# Patient Record
Sex: MALE | Race: WHITE | Employment: OTHER | URBAN - METROPOLITAN AREA
[De-identification: names, ages, dates, MRNs, and addresses within clinical notes are randomized per-mention and may not be internally consistent; named-entity substitution may affect disease eponyms.]

---

## 2022-01-25 ENCOUNTER — HOSPITAL ENCOUNTER (EMERGENCY)
Age: 72
Discharge: HOME | End: 2022-01-25
Attending: EMERGENCY MEDICINE
Payer: MEDICARE

## 2022-01-25 ENCOUNTER — APPOINTMENT (OUTPATIENT)
Dept: CT IMAGING | Age: 72
End: 2022-01-25
Attending: EMERGENCY MEDICINE
Payer: MEDICARE

## 2022-01-25 VITALS
WEIGHT: 157 LBS | RESPIRATION RATE: 16 BRPM | HEIGHT: 69 IN | BODY MASS INDEX: 23.25 KG/M2 | SYSTOLIC BLOOD PRESSURE: 123 MMHG | TEMPERATURE: 98.8 F | HEART RATE: 91 BPM | OXYGEN SATURATION: 98 % | DIASTOLIC BLOOD PRESSURE: 69 MMHG

## 2022-01-25 DIAGNOSIS — R09.1 PLEURISY: Primary | ICD-10-CM

## 2022-01-25 PROCEDURE — 99284 EMERGENCY DEPT VISIT MOD MDM: CPT

## 2022-01-25 PROCEDURE — 74011636637 HC RX REV CODE- 636/637: Performed by: EMERGENCY MEDICINE

## 2022-01-25 PROCEDURE — 74011000636 HC RX REV CODE- 636: Performed by: EMERGENCY MEDICINE

## 2022-01-25 PROCEDURE — 74011250637 HC RX REV CODE- 250/637: Performed by: EMERGENCY MEDICINE

## 2022-01-25 PROCEDURE — 74011000258 HC RX REV CODE- 258: Performed by: EMERGENCY MEDICINE

## 2022-01-25 PROCEDURE — 93005 ELECTROCARDIOGRAM TRACING: CPT

## 2022-01-25 PROCEDURE — 71260 CT THORAX DX C+: CPT

## 2022-01-25 RX ORDER — PREDNISONE 20 MG/1
40 TABLET ORAL DAILY
Qty: 8 TABLET | Refills: 0 | Status: SHIPPED | OUTPATIENT
Start: 2022-01-25 | End: 2022-01-29

## 2022-01-25 RX ORDER — SODIUM CHLORIDE 0.9 % (FLUSH) 0.9 %
10 SYRINGE (ML) INJECTION
Status: COMPLETED | OUTPATIENT
Start: 2022-01-25 | End: 2022-01-25

## 2022-01-25 RX ORDER — BENZONATATE 200 MG/1
200 CAPSULE ORAL
Qty: 21 CAPSULE | Refills: 0 | Status: SHIPPED | OUTPATIENT
Start: 2022-01-25 | End: 2022-02-01

## 2022-01-25 RX ORDER — BENZONATATE 100 MG/1
200 CAPSULE ORAL
Status: COMPLETED | OUTPATIENT
Start: 2022-01-25 | End: 2022-01-25

## 2022-01-25 RX ADMIN — SODIUM CHLORIDE 100 ML: 900 INJECTION, SOLUTION INTRAVENOUS at 20:10

## 2022-01-25 RX ADMIN — IOPAMIDOL 100 ML: 755 INJECTION, SOLUTION INTRAVENOUS at 20:10

## 2022-01-25 RX ADMIN — Medication 10 ML: at 20:10

## 2022-01-25 RX ADMIN — PREDNISONE 60 MG: 10 TABLET ORAL at 21:43

## 2022-01-25 RX ADMIN — BENZONATATE 200 MG: 100 CAPSULE ORAL at 21:43

## 2022-01-26 LAB
ATRIAL RATE: 85 BPM
CALCULATED P AXIS, ECG09: 68 DEGREES
CALCULATED R AXIS, ECG10: 69 DEGREES
CALCULATED T AXIS, ECG11: 53 DEGREES
DIAGNOSIS, 93000: NORMAL
P-R INTERVAL, ECG05: 184 MS
Q-T INTERVAL, ECG07: 354 MS
QRS DURATION, ECG06: 80 MS
QTC CALCULATION (BEZET), ECG08: 421 MS
VENTRICULAR RATE, ECG03: 85 BPM

## 2022-01-26 NOTE — ED TRIAGE NOTES
Arrives with face mask in place. Ambulatory with steady gait into triage. Reports sent from emergency md for R/O PE. States has been experiencing cough since 11/22/2021. COVID positive 1/4 at [place]. Reports covid pneumonia at time. Reports has been seen at emergency md three times since. Began experiencing right sided chest pain, onset couple days ago. States pain only with cough, sneeze and inspiration. Denies increased shortness of breath, fever/chills. CXR done today at urgent care and told continued pneumonia.  Dr palacios made aware of pt, orders received

## 2022-01-26 NOTE — ED PROVIDER NOTES
77-year-old gentleman sent to the ER from [facility] urgent care for a CT angiogram of the chest to rule out pulmonary embolism. Patient's had a cough since late November, started feeling worse in December and was diagnosed with COVID January 4. He went to the [address] [place] [address] where they gave him antibiotics for the virus but would not let a man, he did not go to the ER or seek any monoclonal antibody treatment. Patient feels that for the most part he is over the Covid he is having no fevers no chills no body aches, no nausea or diarrhea. He has persistent cough and some pleuritic right-sided sharp chest pain which is worse with breathing. No relief with aspirin. Chest x-ray today at the urgent care reveals continued infiltrates, his GFR was greater than 60 with a creatinine of 1.2 on the blood work done today. No past medical history on file. No past surgical history on file. No family history on file. Social History     Socioeconomic History    Marital status:      Spouse name: Not on file    Number of children: Not on file    Years of education: Not on file    Highest education level: Not on file   Occupational History    Not on file   Tobacco Use    Smoking status: Not on file    Smokeless tobacco: Not on file   Substance and Sexual Activity    Alcohol use: Not on file    Drug use: Not on file    Sexual activity: Not on file   Other Topics Concern    Not on file   Social History Narrative    Not on file     Social Determinants of Health     Financial Resource Strain:     Difficulty of Paying Living Expenses: Not on file   Food Insecurity:     Worried About Running Out of Food in the Last Year: Not on file    Farrukh of Food in the Last Year: Not on file   Transportation Needs:     Lack of Transportation (Medical): Not on file    Lack of Transportation (Non-Medical):  Not on file   Physical Activity:     Days of Exercise per Week: Not on file    Minutes of Exercise per Session: Not on file   Stress:     Feeling of Stress : Not on file   Social Connections:     Frequency of Communication with Friends and Family: Not on file    Frequency of Social Gatherings with Friends and Family: Not on file    Attends Jew Services: Not on file    Active Member of Clubs or Organizations: Not on file    Attends Club or Organization Meetings: Not on file    Marital Status: Not on file   Intimate Partner Violence:     Fear of Current or Ex-Partner: Not on file    Emotionally Abused: Not on file    Physically Abused: Not on file    Sexually Abused: Not on file   Housing Stability:     Unable to Pay for Housing in the Last Year: Not on file    Number of [place] in the Last Year: Not on file    Unstable Housing in the Last Year: Not on file         ALLERGIES: Patient has no known allergies. Review of Systems   Constitutional: Negative for chills and fever. HENT: Negative for rhinorrhea and sore throat. Eyes: Negative for discharge and redness. Respiratory: Positive for cough. Negative for shortness of breath. Cardiovascular: Positive for chest pain. Negative for palpitations and leg swelling. Gastrointestinal: Negative for abdominal pain, diarrhea, nausea and vomiting. Musculoskeletal: Negative for arthralgias and back pain. Skin: Negative for rash. Neurological: Negative for dizziness and headaches. All other systems reviewed and are negative. Vitals:    01/25/22 1917   BP: 128/70   Pulse: 93   Resp: 18   Temp: 97.7 °F (36.5 °C)   SpO2: 97%   Weight: 71.2 kg (157 lb)   Height: 5' 9\" (1.753 m)            Physical Exam  Vitals and nursing note reviewed. Constitutional:       General: He is not in acute distress. Appearance: Normal appearance. He is well-developed. He is not ill-appearing, toxic-appearing or diaphoretic. HENT:      Head: Normocephalic and atraumatic.       Right Ear: External ear normal.      Left Ear: External ear normal. Eyes:      General:         Right eye: No discharge. Left eye: No discharge. Conjunctiva/sclera: Conjunctivae normal.   Pulmonary:      Effort: Pulmonary effort is normal. No respiratory distress. Musculoskeletal:         General: Normal range of motion. Cervical back: Normal range of motion and neck supple. Skin:     General: Skin is warm and dry. Findings: No rash. Neurological:      General: No focal deficit present. Mental Status: He is alert and oriented to person, place, and time. Mental status is at baseline. Motor: No abnormal muscle tone. Comments: cni 2-12 grossly  Nl gait,  Nl speech     Psychiatric:         Mood and Affect: Mood normal.         Behavior: Behavior normal.          MDM  Number of Diagnoses or Management Options  Pleurisy: new and requires workup  Diagnosis management comments: Medical decision making note:  Postcode pleurisy, negative for pulmonary believes him on CT here today, mild patchy viral pneumonitis seen. Will start naproxen instead of aspirin, add Tessalon, Mucinex, and prednisone. He has nebulizer and an inhaler at home, will provide a spacer and teaching for use with his MDI  This concludes the \"medical decision making note\" part of this emergency department visit note. Amount and/or Complexity of Data Reviewed  Tests in the radiology section of CPT®: reviewed and ordered (CT CHEST PULMONARY EMBOLISM   Final Result    1. No evidence of pulmonary embolism.     2. Findings most compatible with viral pneumonitis.)  Decide to obtain previous medical records or to obtain history from someone other than the patient: yes    Risk of Complications, Morbidity, and/or Mortality  Presenting problems: moderate  Diagnostic procedures: low  Management options: low    Patient Progress  Patient progress: improved         Procedures

## 2023-12-05 ENCOUNTER — OFFICE VISIT (OUTPATIENT)
Dept: UROLOGY | Age: 73
End: 2023-12-05
Payer: OTHER GOVERNMENT

## 2023-12-05 DIAGNOSIS — R97.20 ELEVATED PSA: Primary | ICD-10-CM

## 2023-12-05 LAB
BILIRUBIN, URINE, POC: NEGATIVE
BLOOD URINE, POC: NORMAL
GLUCOSE URINE, POC: NEGATIVE
KETONES, URINE, POC: NEGATIVE
LEUKOCYTE ESTERASE, URINE, POC: NEGATIVE
NITRITE, URINE, POC: NEGATIVE
PH, URINE, POC: 5.5 (ref 4.6–8)
PROTEIN,URINE, POC: NORMAL
SPECIFIC GRAVITY, URINE, POC: >=1.03 (ref 1–1.03)
URINALYSIS CLARITY, POC: NORMAL
URINALYSIS COLOR, POC: NORMAL
UROBILINOGEN, POC: NORMAL

## 2023-12-05 PROCEDURE — 81003 URINALYSIS AUTO W/O SCOPE: CPT | Performed by: UROLOGY

## 2023-12-05 PROCEDURE — 1123F ACP DISCUSS/DSCN MKR DOCD: CPT | Performed by: UROLOGY

## 2023-12-05 PROCEDURE — 99204 OFFICE O/P NEW MOD 45 MIN: CPT | Performed by: UROLOGY

## 2023-12-05 ASSESSMENT — ENCOUNTER SYMPTOMS
RESPIRATORY NEGATIVE: 1
EYES NEGATIVE: 1
HEARTBURN: 1

## 2023-12-06 LAB
PSA FREE MFR SERPL: 19.7 %
PSA FREE SERPL-MCNC: 1.3 NG/ML
PSA SERPL-MCNC: 6.6 NG/ML

## 2023-12-14 ENCOUNTER — HOSPITAL ENCOUNTER (OUTPATIENT)
Dept: MRI IMAGING | Age: 73
Discharge: HOME | End: 2023-12-14
Attending: UROLOGY
Payer: OTHER GOVERNMENT

## 2023-12-14 DIAGNOSIS — R97.20 ELEVATED PSA: ICD-10-CM

## 2023-12-14 PROCEDURE — 72197 MRI PELVIS W/O & W/DYE: CPT

## 2023-12-14 PROCEDURE — A9579 GAD-BASE MR CONTRAST NOS,1ML: HCPCS | Performed by: UROLOGY

## 2023-12-14 PROCEDURE — 6360000004 HC RX CONTRAST MEDICATION: Performed by: UROLOGY

## 2023-12-14 RX ADMIN — GADOTERIDOL 15 ML: 279.3 INJECTION, SOLUTION INTRAVENOUS at 07:49

## 2023-12-26 ENCOUNTER — TELEPHONE (OUTPATIENT)
Dept: UROLOGY | Age: 73
End: 2023-12-26

## 2023-12-27 ENCOUNTER — TELEPHONE (OUTPATIENT)
Dept: UROLOGY | Age: 73
End: 2023-12-27

## 2023-12-27 DIAGNOSIS — R97.20 ELEVATED PROSTATE SPECIFIC ANTIGEN (PSA): ICD-10-CM

## 2023-12-27 NOTE — TELEPHONE ENCOUNTER
----- Message from Winston Freeman DO sent at 12/27/2023  3:55 PM EST -----  I reviewed with pt.  He has elected to proceed with MRI fusion biopsies of the prostate.  All risks, benefits and alternatives to the above mentioned procedure were discussed and the patient is willing to proceed at this time.  30min  Mac  Ua day of  Outpt  Rocephin 1g IV preop  I sent cipro

## 2023-12-28 ENCOUNTER — PREP FOR PROCEDURE (OUTPATIENT)
Dept: UROLOGY | Age: 73
End: 2023-12-28

## 2023-12-28 DIAGNOSIS — C61 PROSTATE CANCER (HCC): Primary | ICD-10-CM

## 2023-12-28 PROBLEM — R97.20 ELEVATED PROSTATE SPECIFIC ANTIGEN (PSA): Status: ACTIVE | Noted: 2023-12-27

## 2023-12-28 NOTE — TELEPHONE ENCOUNTER
Procedures: Procedure(s):   PROSTATE BIOPSY FUSION   Date: 1/29/2024   Time: 0900   Location: [place] [address]

## 2024-01-25 NOTE — PERIOP NOTE
Patient verified name and .  Order for consent found in EHR and matches case posting; patient verifies procedure.   Type 1B surgery, Phone assessment complete.  Orders received.  Labs per surgeon: none  Labs per anesthesia protocol: None    Patient answered medical/surgical history questions at their best of ability. All prior to admission medications documented in EPIC.  Patient instructed to take the following medications the day of surgery according to anesthesia guidelines with a small sip of water: None On the day before surgery please take 2 Tylenol in the morning and then again before bed. You may use either regular or extra strength.   Hold all vitamins 7 days prior to surgery and NSAIDS 5 days prior to surgery. Prescription meds to hold:None  Patient instructed on the following:    > Arrive at MAIN Entrance, time of arrival to be called the day before by 1700  > NPO after midnight, unless otherwise indicated, including gum, mints, and ice chips  > Responsible adult must drive patient to the hospital, stay during surgery, and patient will need supervision 24 hours after anesthesia  > Use non moisturizing soap in shower the night before surgery and on the morning of surgery  > All piercings must be removed prior to arrival.    > Leave all valuables (money and jewelry) at home but bring insurance card and ID on DOS.   > You may be required to pay a deductible or co-pay on the day of your procedure. You can pre-pay by calling [phone] if your surgery is at the [facility] or [phone] if your surgery is at the [facility].  > Do not wear make-up, nail polish, lotions, cologne, perfumes, powders, or oil on skin. Artificial nails are not permitted.

## 2024-01-28 ENCOUNTER — ANESTHESIA EVENT (OUTPATIENT)
Dept: SURGERY | Age: 74
End: 2024-01-28
Payer: OTHER GOVERNMENT

## 2024-01-29 ENCOUNTER — HOSPITAL ENCOUNTER (OUTPATIENT)
Age: 74
Setting detail: OUTPATIENT SURGERY
Discharge: HOME | End: 2024-01-29
Attending: UROLOGY | Admitting: UROLOGY
Payer: OTHER GOVERNMENT

## 2024-01-29 ENCOUNTER — ANESTHESIA (OUTPATIENT)
Dept: SURGERY | Age: 74
End: 2024-01-29
Payer: OTHER GOVERNMENT

## 2024-01-29 VITALS
TEMPERATURE: 97.6 F | DIASTOLIC BLOOD PRESSURE: 72 MMHG | OXYGEN SATURATION: 98 % | HEIGHT: 69 IN | WEIGHT: 172.2 LBS | HEART RATE: 66 BPM | SYSTOLIC BLOOD PRESSURE: 150 MMHG | BODY MASS INDEX: 25.51 KG/M2 | RESPIRATION RATE: 15 BRPM

## 2024-01-29 LAB
APPEARANCE UR: CLEAR
BILIRUB UR QL: NEGATIVE
COLOR UR: NORMAL
GLUCOSE UR STRIP.AUTO-MCNC: NEGATIVE MG/DL
HGB UR QL STRIP: NEGATIVE
KETONES UR QL STRIP.AUTO: NEGATIVE MG/DL
LEUKOCYTE ESTERASE UR QL STRIP.AUTO: NEGATIVE
NITRITE UR QL STRIP.AUTO: NEGATIVE
PH UR STRIP: 5 (ref 5–9)
PROT UR STRIP-MCNC: NEGATIVE MG/DL
SP GR UR REFRACTOMETRY: 1.02 (ref 1–1.02)
UROBILINOGEN UR QL STRIP.AUTO: 0.2 EU/DL (ref 0.2–1)

## 2024-01-29 PROCEDURE — 2580000003 HC RX 258: Performed by: UROLOGY

## 2024-01-29 PROCEDURE — 3700000000 HC ANESTHESIA ATTENDED CARE: Performed by: UROLOGY

## 2024-01-29 PROCEDURE — 2500000003 HC RX 250 WO HCPCS: Performed by: NURSE ANESTHETIST, CERTIFIED REGISTERED

## 2024-01-29 PROCEDURE — 76872 US TRANSRECTAL: CPT | Performed by: UROLOGY

## 2024-01-29 PROCEDURE — 6360000002 HC RX W HCPCS: Performed by: NURSE ANESTHETIST, CERTIFIED REGISTERED

## 2024-01-29 PROCEDURE — 3700000001 HC ADD 15 MINUTES (ANESTHESIA): Performed by: UROLOGY

## 2024-01-29 PROCEDURE — 7100000011 HC PHASE II RECOVERY - ADDTL 15 MIN: Performed by: UROLOGY

## 2024-01-29 PROCEDURE — 2580000003 HC RX 258: Performed by: ANESTHESIOLOGY

## 2024-01-29 PROCEDURE — 81003 URINALYSIS AUTO W/O SCOPE: CPT

## 2024-01-29 PROCEDURE — 7100000001 HC PACU RECOVERY - ADDTL 15 MIN: Performed by: UROLOGY

## 2024-01-29 PROCEDURE — 7100000000 HC PACU RECOVERY - FIRST 15 MIN: Performed by: UROLOGY

## 2024-01-29 PROCEDURE — 7100000010 HC PHASE II RECOVERY - FIRST 15 MIN: Performed by: UROLOGY

## 2024-01-29 PROCEDURE — 55700 PR PROSTATE NEEDLE BIOPSY ANY APPROACH: CPT | Performed by: UROLOGY

## 2024-01-29 PROCEDURE — 88305 TISSUE EXAM BY PATHOLOGIST: CPT

## 2024-01-29 PROCEDURE — 3600000012 HC SURGERY LEVEL 2 ADDTL 15MIN: Performed by: UROLOGY

## 2024-01-29 PROCEDURE — 3600000002 HC SURGERY LEVEL 2 BASE: Performed by: UROLOGY

## 2024-01-29 PROCEDURE — 6370000000 HC RX 637 (ALT 250 FOR IP): Performed by: ANESTHESIOLOGY

## 2024-01-29 PROCEDURE — 2709999900 HC NON-CHARGEABLE SUPPLY: Performed by: UROLOGY

## 2024-01-29 PROCEDURE — 6360000002 HC RX W HCPCS: Performed by: UROLOGY

## 2024-01-29 RX ORDER — SODIUM CHLORIDE, SODIUM LACTATE, POTASSIUM CHLORIDE, CALCIUM CHLORIDE 600; 310; 30; 20 MG/100ML; MG/100ML; MG/100ML; MG/100ML
INJECTION, SOLUTION INTRAVENOUS CONTINUOUS
Status: DISCONTINUED | OUTPATIENT
Start: 2024-01-29 | End: 2024-01-29 | Stop reason: HOSPADM

## 2024-01-29 RX ORDER — FENTANYL CITRATE 50 UG/ML
100 INJECTION, SOLUTION INTRAMUSCULAR; INTRAVENOUS
Status: DISCONTINUED | OUTPATIENT
Start: 2024-01-29 | End: 2024-01-29 | Stop reason: HOSPADM

## 2024-01-29 RX ORDER — FAMOTIDINE 20 MG/1
20 TABLET, FILM COATED ORAL ONCE
Status: COMPLETED | OUTPATIENT
Start: 2024-01-29 | End: 2024-01-29

## 2024-01-29 RX ORDER — OXYCODONE HYDROCHLORIDE 5 MG/1
10 TABLET ORAL PRN
Status: DISCONTINUED | OUTPATIENT
Start: 2024-01-29 | End: 2024-01-29 | Stop reason: HOSPADM

## 2024-01-29 RX ORDER — SODIUM CHLORIDE 0.9 % (FLUSH) 0.9 %
5-40 SYRINGE (ML) INJECTION EVERY 12 HOURS SCHEDULED
Status: DISCONTINUED | OUTPATIENT
Start: 2024-01-29 | End: 2024-01-29 | Stop reason: HOSPADM

## 2024-01-29 RX ORDER — PROPOFOL 10 MG/ML
INJECTION, EMULSION INTRAVENOUS PRN
Status: DISCONTINUED | OUTPATIENT
Start: 2024-01-29 | End: 2024-01-29 | Stop reason: SDUPTHER

## 2024-01-29 RX ORDER — LIDOCAINE HYDROCHLORIDE 10 MG/ML
1 INJECTION, SOLUTION INFILTRATION; PERINEURAL
Status: DISCONTINUED | OUTPATIENT
Start: 2024-01-29 | End: 2024-01-29 | Stop reason: HOSPADM

## 2024-01-29 RX ORDER — HYDROMORPHONE HYDROCHLORIDE 2 MG/ML
0.25 INJECTION, SOLUTION INTRAMUSCULAR; INTRAVENOUS; SUBCUTANEOUS EVERY 5 MIN PRN
Status: DISCONTINUED | OUTPATIENT
Start: 2024-01-29 | End: 2024-01-29 | Stop reason: HOSPADM

## 2024-01-29 RX ORDER — SODIUM CHLORIDE 9 MG/ML
INJECTION, SOLUTION INTRAVENOUS PRN
Status: DISCONTINUED | OUTPATIENT
Start: 2024-01-29 | End: 2024-01-29 | Stop reason: HOSPADM

## 2024-01-29 RX ORDER — MIDAZOLAM HYDROCHLORIDE 2 MG/2ML
2 INJECTION, SOLUTION INTRAMUSCULAR; INTRAVENOUS
Status: DISCONTINUED | OUTPATIENT
Start: 2024-01-29 | End: 2024-01-29 | Stop reason: HOSPADM

## 2024-01-29 RX ORDER — OXYCODONE HYDROCHLORIDE 5 MG/1
5 TABLET ORAL PRN
Status: DISCONTINUED | OUTPATIENT
Start: 2024-01-29 | End: 2024-01-29 | Stop reason: HOSPADM

## 2024-01-29 RX ORDER — LIDOCAINE HYDROCHLORIDE 20 MG/ML
INJECTION, SOLUTION EPIDURAL; INFILTRATION; INTRACAUDAL; PERINEURAL PRN
Status: DISCONTINUED | OUTPATIENT
Start: 2024-01-29 | End: 2024-01-29 | Stop reason: SDUPTHER

## 2024-01-29 RX ORDER — SODIUM CHLORIDE 9 MG/ML
INJECTION, SOLUTION INTRAVENOUS CONTINUOUS
Status: DISCONTINUED | OUTPATIENT
Start: 2024-01-29 | End: 2024-01-29 | Stop reason: HOSPADM

## 2024-01-29 RX ORDER — ONDANSETRON 2 MG/ML
4 INJECTION INTRAMUSCULAR; INTRAVENOUS
Status: DISCONTINUED | OUTPATIENT
Start: 2024-01-29 | End: 2024-01-29 | Stop reason: HOSPADM

## 2024-01-29 RX ORDER — ACETAMINOPHEN 500 MG
1000 TABLET ORAL ONCE
Status: COMPLETED | OUTPATIENT
Start: 2024-01-29 | End: 2024-01-29

## 2024-01-29 RX ORDER — HYDROMORPHONE HYDROCHLORIDE 2 MG/ML
0.5 INJECTION, SOLUTION INTRAMUSCULAR; INTRAVENOUS; SUBCUTANEOUS EVERY 10 MIN PRN
Status: DISCONTINUED | OUTPATIENT
Start: 2024-01-29 | End: 2024-01-29 | Stop reason: HOSPADM

## 2024-01-29 RX ORDER — DIPHENHYDRAMINE HYDROCHLORIDE 50 MG/ML
12.5 INJECTION INTRAMUSCULAR; INTRAVENOUS
Status: DISCONTINUED | OUTPATIENT
Start: 2024-01-29 | End: 2024-01-29 | Stop reason: HOSPADM

## 2024-01-29 RX ORDER — SODIUM CHLORIDE 0.9 % (FLUSH) 0.9 %
5-40 SYRINGE (ML) INJECTION PRN
Status: DISCONTINUED | OUTPATIENT
Start: 2024-01-29 | End: 2024-01-29 | Stop reason: HOSPADM

## 2024-01-29 RX ADMIN — PROPOFOL 50 MG: 10 INJECTION, EMULSION INTRAVENOUS at 08:15

## 2024-01-29 RX ADMIN — WATER 1000 MG: 1 INJECTION INTRAMUSCULAR; INTRAVENOUS; SUBCUTANEOUS at 08:12

## 2024-01-29 RX ADMIN — LIDOCAINE HYDROCHLORIDE 100 MG: 20 INJECTION, SOLUTION EPIDURAL; INFILTRATION; INTRACAUDAL; PERINEURAL at 08:10

## 2024-01-29 RX ADMIN — SODIUM CHLORIDE, POTASSIUM CHLORIDE, SODIUM LACTATE AND CALCIUM CHLORIDE: 600; 310; 30; 20 INJECTION, SOLUTION INTRAVENOUS at 07:50

## 2024-01-29 RX ADMIN — PROPOFOL 50 MG: 10 INJECTION, EMULSION INTRAVENOUS at 08:10

## 2024-01-29 RX ADMIN — PROPOFOL 50 MG: 10 INJECTION, EMULSION INTRAVENOUS at 08:18

## 2024-01-29 RX ADMIN — ACETAMINOPHEN 1000 MG: 500 TABLET ORAL at 07:52

## 2024-01-29 RX ADMIN — FAMOTIDINE 20 MG: 20 TABLET, FILM COATED ORAL at 07:52

## 2024-01-29 RX ADMIN — PROPOFOL 50 MG: 10 INJECTION, EMULSION INTRAVENOUS at 08:12

## 2024-01-29 ASSESSMENT — PAIN - FUNCTIONAL ASSESSMENT
PAIN_FUNCTIONAL_ASSESSMENT: NONE - DENIES PAIN
PAIN_FUNCTIONAL_ASSESSMENT: NONE - DENIES PAIN
PAIN_FUNCTIONAL_ASSESSMENT: 0-10

## 2024-01-29 NOTE — ANESTHESIA POSTPROCEDURE EVALUATION
Department of Anesthesiology  Postprocedure Note    Patient: Bennie Garrett  MRN: [id number]  YOB: 1950  Date of evaluation: 1/29/2024    Procedure Summary     Date: 01/29/24 Room / Location: [facility] MAIN OR  / [facility] MAIN OR    Anesthesia Start: 0806 Anesthesia Stop: 0829    Procedure: PROSTATE BIOPSY FUSION (Anus) Diagnosis:       Elevated prostate specific antigen (PSA)      (Elevated prostate specific antigen (PSA) [R97.20])    Providers: Winston Freeman DO Responsible Provider: Vibha Amin MD    Anesthesia Type: MAC ASA Status: 3          Anesthesia Type: MAC    Joshua Phase I: Joshua Score: 8    Joshua Phase II: Joshua Score: 10    Anesthesia Post Evaluation    Patient location during evaluation: PACU  Patient participation: complete - patient participated  Level of consciousness: awake and alert  Airway patency: patent  Nausea & Vomiting: no nausea  Cardiovascular status: hemodynamically stable  Respiratory status: acceptable  Hydration status: euvolemic  Pain management: adequate and satisfactory to patient        No notable events documented.

## 2024-01-29 NOTE — ANESTHESIA PRE PROCEDURE
Department of Anesthesiology  Preprocedure Note       Name:  Bennie Garrett   Age:  73 y.o.  :  1950                                          MRN:  [id number]         Date:  2024      Surgeon: Surgeon(s):  Winston Freeman DO    Procedure: Procedure(s):  PROSTATE BIOPSY FUSION    Medications prior to admission:   Prior to Admission medications    Not on File       Current medications:    Current Facility-Administered Medications   Medication Dose Route Frequency Provider Last Rate Last Admin    lidocaine 1 % injection 1 mL  1 mL IntraDERmal Once PRN Vibha Amin MD        acetaminophen (TYLENOL) tablet 1,000 mg  1,000 mg Oral Once Vibha Amin MD        fentaNYL (SUBLIMAZE) injection 100 mcg  100 mcg IntraVENous Once PRN Vibha Amin MD        famotidine (PEPCID) tablet 20 mg  20 mg Oral Once Vibha Amin MD        0.9 % sodium chloride infusion   IntraVENous Continuous Vibha Amin MD        lactated ringers IV soln infusion   IntraVENous Continuous Vibha Amin MD        sodium chloride flush 0.9 % injection 5-40 mL  5-40 mL IntraVENous 2 times per day Vibha Amin MD        sodium chloride flush 0.9 % injection 5-40 mL  5-40 mL IntraVENous PRN Vibha Amin MD        0.9 % sodium chloride infusion   IntraVENous PRN Vibha Amin MD        midazolam PF (VERSED) injection 2 mg  2 mg IntraVENous Once PRN Vibha Amin MD        cefTRIAXone (ROCEPHIN) 1,000 mg in sterile water 10 mL IV syringe  1,000 mg IntraVENous On Call to OR Winston Freeman DO           Allergies:  No Known Allergies    Problem List:    Patient Active Problem List   Diagnosis Code    Elevated prostate specific antigen (PSA) R97.20       Past Medical History:  History reviewed. No pertinent past medical history.    Past Surgical History:  History reviewed. No pertinent surgical history.    Social History:    Social History     Tobacco Use

## 2024-01-29 NOTE — H&P
[facility]  [address] [place] [address]  [phone]    Bennie Garrett  : 1950     HPI   73 y.o., male returns in follow up for ane elevated PSA.  PSA was 6.6 (19% free) on 23.  MRI on 23 showed a pirads 5 LM 2.7cm TZ lesion.      History reviewed. No pertinent past medical history.  History reviewed. No pertinent surgical history.  Current Facility-Administered Medications   Medication Dose Route Frequency Provider Last Rate Last Admin    lidocaine 1 % injection 1 mL  1 mL IntraDERmal Once PRN Vibha Amin MD        fentaNYL (SUBLIMAZE) injection 100 mcg  100 mcg IntraVENous Once PRN Vibha Amin MD        0.9 % sodium chloride infusion   IntraVENous Continuous Vibha Amin MD        lactated ringers IV soln infusion   IntraVENous Continuous Vibha Amin  mL/hr at 24 0750 New Bag at 24 0750    sodium chloride flush 0.9 % injection 5-40 mL  5-40 mL IntraVENous 2 times per day Vibha Amin MD        sodium chloride flush 0.9 % injection 5-40 mL  5-40 mL IntraVENous PRN Vibha Amin MD        0.9 % sodium chloride infusion   IntraVENous PRN Vibha Amin MD        midazolam PF (VERSED) injection 2 mg  2 mg IntraVENous Once PRN Vibha Amin MD        cefTRIAXone (ROCEPHIN) 1,000 mg in sterile water 10 mL IV syringe  1,000 mg IntraVENous On Call to OR Winston Freeman DO         No Known Allergies  Social History     Socioeconomic History    Marital status:      Spouse name: Not on file    Number of children: Not on file    Years of education: Not on file    Highest education level: Not on file   Occupational History    Not on file   Tobacco Use    Smoking status: Never    Smokeless tobacco: Never   Vaping Use    Vaping Use: Never used   Substance and Sexual Activity    Alcohol use: Never    Drug use: Never    Sexual activity: Not on file   Other Topics Concern    Not on file

## 2024-01-29 NOTE — BRIEF OP NOTE
Brief Postoperative Note      Patient: Bennie Garrett  YOB: 1950  MRN: [id number]    Date of Procedure: 1/29/2024    Pre-Op Diagnosis Codes:     * Elevated prostate specific antigen (PSA) [R97.20]    Post-Op Diagnosis: Same       Procedure(s):  PROSTATE BIOPSY FUSION    Surgeon(s):  Joyce Goode,     Assistant:  * No surgical staff found *    Anesthesia: Monitor Anesthesia Care    Estimated Blood Loss (mL): <5cc    Complications: none immediate    Specimens:   ID Type Source Tests Collected by Time Destination   A : LLB Tissue Prostate SURGICAL PATHOLOGY Joyce Goode P, DO 1/29/2024 0810    B : LLM Tissue Prostate SURGICAL PATHOLOGY Joyce Goode P, DO 1/29/2024 0811    C : LLA Tissue Prostate SURGICAL PATHOLOGY Joyce Goode P, DO 1/29/2024 0811    D : LB Tissue Prostate SURGICAL PATHOLOGY Joyce Goode P, DO 1/29/2024 0811    E : LM Tissue Prostate SURGICAL PATHOLOGY Joyce Goode P, DO 1/29/2024 0811    F : LA Tissue Prostate SURGICAL PATHOLOGY Joyce Goode P, DO 1/29/2024 0811    G : RLB Tissue Prostate SURGICAL PATHOLOGY Joyce Goode P, DO 1/29/2024 0811    H : RLM Tissue Prostate SURGICAL PATHOLOGY Joyce Goode P, DO 1/29/2024 0811    I : RLA Tissue Prostate SURGICAL PATHOLOGY Joyce Goode P, DO 1/29/2024 0811    J : RA Tissue Prostate SURGICAL PATHOLOGY Joyce Goode P, DO 1/29/2024 0811    K : RM Tissue Prostate SURGICAL PATHOLOGY Joyce Goode P, DO 1/29/2024 0811    L : RB Tissue Prostate SURGICAL PATHOLOGY Joyce Goode P, DO 1/29/2024 0812    M : BISMARK 1 LEFT ANTERIOR Tissue Prostate SURGICAL PATHOLOGY Joyce Goode P, DO 1/29/2024 0812        Implants:  * No implants in log *      Drains: * No LDAs found *    Findings: see op  note      Electronically signed by JOYCE GOODE DO on 1/29/2024 at 8:31 AM

## 2024-01-29 NOTE — OP NOTE
[facility]  OPERATIVE REPORT    Name:  MARGRET CAMARA  MR#:  [id number]  :  1950  ACCOUNT #:  [id number]  DATE OF SERVICE:  2024    PREOPERATIVE DIAGNOSIS:  Elevated prostate-specific antigen.    POSTOPERATIVE DIAGNOSIS:  Elevated prostate-specific antigen.    PROCEDURE PERFORMED:  MRI fusion biopsies of the prostate.    SURGEON:  Winston Freeman DO    ASSISTANT:  None.    ANESTHESIA:  MAC.    COMPLICATIONS:  None immediate.    SPECIMENS REMOVED:  Prostate biopsies.    IMPLANTS:  None.    ESTIMATED BLOOD LOSS:  Less than 5 mL.    CLINICAL HISTORY:  This is a 73-year-old gentleman, who was recently found to have an elevated PSA of 6.6 on 2023.  An MRI of the prostate on 2023 shows a PI-RADS 5 left mid anterior lesion.  All risks, benefits and alternatives to the above-mentioned procedure have been discussed and he is willing to proceed at this time.    DESCRIPTION OF OPERATIVE PROCEDURE:  Patient consent was obtained.  The patient was brought back to the operating room at which time he was placed in a modified right lateral decubitus position.  All pressure points were carefully padded and the patient was secured to the table.  After the uneventful induction of MAC anesthesia, a digital rectal examination was performed.  This revealed an anodular prostate.  The transrectal ultrasound probe was then inserted into the rectum and the prostate was surveyed.  The prostate was mildly heterogeneous in nature.  No discrete lesions were seen.  Volumetric measurements were obtained.  Calculated prostate volume was 26 mL.  Using the UroNav software, the previously marked MRI images were fused to the ultrasound images.  There was one region of interest that corresponded to a lesion at the left anterior gland.  This area was biopsied several times.  In addition, a standard sextant biopsy of the prostate was performed.  Three far lateral biopsies were obtained from the base, mid

## 2024-01-29 NOTE — DISCHARGE INSTRUCTIONS
Narcotics:  Limit your activities  A responsible adult needs to be with you for the next 24 hours  Do not drive and operate hazardous machinery  Do not make important personal or business decisions  Do not drink alcoholic beverages  If you have not urinated within 8 hours after discharge, and you are experiencing discomfort from urinary retention, please go to the nearest ED.  If you have sleep apnea and have a CPAP machine, please use it for all naps and sleeping.  Please use caution when taking narcotics and any of your home medications that may cause drowsiness.  *  Please give a list of your current medications to your Primary Care Provider.  *  Please update this list whenever your medications are discontinued, doses are      changed, or new medications (including over-the-counter products) are added.  *  Please carry medication information at all times in case of emergency situations.    These are general instructions for a healthy lifestyle:  No smoking/ No tobacco products/ Avoid exposure to second hand smoke  Surgeon General's Warning:  Quitting smoking now greatly reduces serious risk to your health.  Obesity, smoking, and sedentary lifestyle greatly increases your risk for illness  A healthy diet, regular physical exercise & weight monitoring are important for maintaining a healthy lifestyle    You may be retaining fluid if you have a history of heart failure or if you experience any of the following symptoms:  Weight gain of 3 pounds or more overnight or 5 pounds in a week, increased swelling in our hands or feet or shortness of breath while lying flat in bed.  Please call your doctor as soon as you notice any of these symptoms; do not wait until your next office visit.

## 2024-02-13 ENCOUNTER — TELEPHONE (OUTPATIENT)
Dept: UROLOGY | Age: 74
End: 2024-02-13

## 2024-02-13 ENCOUNTER — OFFICE VISIT (OUTPATIENT)
Dept: UROLOGY | Age: 74
End: 2024-02-13
Payer: OTHER GOVERNMENT

## 2024-02-13 DIAGNOSIS — C61 PROSTATE CANCER (HCC): Primary | ICD-10-CM

## 2024-02-13 DIAGNOSIS — C61 MALIGNANT NEOPLASM OF PROSTATE (HCC): ICD-10-CM

## 2024-02-13 PROCEDURE — 1123F ACP DISCUSS/DSCN MKR DOCD: CPT | Performed by: UROLOGY

## 2024-02-13 PROCEDURE — 99214 OFFICE O/P EST MOD 30 MIN: CPT | Performed by: UROLOGY

## 2024-02-13 NOTE — TELEPHONE ENCOUNTER
----- Message from Winston Freeman DO sent at 2/13/2024 12:45 PM EST -----  Regarding: surg  RALP  Not before mid March

## 2024-02-13 NOTE — PROGRESS NOTES
[facility]  [address] [place] [address]  [phone]    Bennie Garrett  : 1950     HPI   73 y.o., male returns in follow up for CaP.  PSA was 6.6 (19% free) on 23. MRI on 23 showed a pirads 5 LM 2.7cm TZ lesion.   Fusion biopsies completed on 24.  Path showed [place] 4+3 in a LM core and [place] 6 in 6 biopsy cores.  Vol was 26g.  Prior bilateral IHR and UHR.  Retired .      History reviewed. No pertinent past medical history.  Past Surgical History:   Procedure Laterality Date    PROSTATE BIOPSY N/A 2024    PROSTATE BIOPSY FUSION performed by Winston Freeman DO at [facility] MAIN OR     No current outpatient medications on file.     No current facility-administered medications for this visit.     No Known Allergies  Social History     Socioeconomic History    Marital status:      Spouse name: Not on file    Number of children: Not on file    Years of education: Not on file    Highest education level: Not on file   Occupational History    Not on file   Tobacco Use    Smoking status: Never    Smokeless tobacco: Never   Vaping Use    Vaping Use: Never used   Substance and Sexual Activity    Alcohol use: Never    Drug use: Never    Sexual activity: Not on file   Other Topics Concern    Not on file   Social History Narrative    Not on file     Social Determinants of Health     Financial Resource Strain: Not on file   Food Insecurity: Not on file   Transportation Needs: Not on file   Physical Activity: Not on file   Stress: Not on file   Social Connections: Not on file   Intimate Partner Violence: Not on file   Housing Stability: Not on file     History reviewed. No pertinent family history.    Review of Systems  All systems reviewed and are negative at this time.    Physical Exam  There were no vitals taken for this visit.  General appearance - alert, well appearing, and in no distress  Mental status - alert and oriented  Eyes - extraocular eye

## 2024-02-23 DIAGNOSIS — C61 MALIGNANT NEOPLASM OF PROSTATE (HCC): Primary | ICD-10-CM

## 2024-02-23 NOTE — TELEPHONE ENCOUNTER
Procedures: Procedure(s):   PROSTATECTOMY LAPAROSCOPIC ROBOTIC/POSSIBLE BILATERAL LYMPH NODE DISSECTION   Date: 3/28/2024   Time: 1032   Location: [facility] MAIN OR 11

## 2024-03-04 ENCOUNTER — HOSPITAL ENCOUNTER (OUTPATIENT)
Dept: PHYSICAL THERAPY | Age: 74
Setting detail: RECURRING SERIES
Discharge: HOME | End: 2024-03-07
Attending: UROLOGY
Payer: OTHER GOVERNMENT

## 2024-03-04 DIAGNOSIS — R27.9 LACK OF COORDINATION: Primary | ICD-10-CM

## 2024-03-04 PROCEDURE — 97161 PT EVAL LOW COMPLEX 20 MIN: CPT

## 2024-03-04 PROCEDURE — 97530 THERAPEUTIC ACTIVITIES: CPT

## 2024-03-04 ASSESSMENT — PAIN SCALES - GENERAL: PAINLEVEL_OUTOF10: 0

## 2024-03-04 NOTE — THERAPY EVALUATION
1x/week for 90 days     Interventions Planned (Treatment may consist of any combination of the following):    Home Exercise Program (HEP), Therapeutic Activites, and Therapeutic Exercise/Strengthening     Goals: (Goals have been discussed and agreed upon with patient.)  Short-Term Functional Goals: Time Frame: 4 weeks  Patient will demonstrate I with basic PFM HEP to improve awareness, coordination, and timing of PFM.  Patient will verbalize an understanding of pelvic anatomy and causes of post op incontinence.  Patient will demonstrate understanding of and ability to teach back appropriate water intake, bladder irritants, toileting frequency, and positioning for improved self-management of symptoms.        Discharge Goals:   DISCHARGE GOALS TO BE DETERMINED POST OPERATIVELY            Outcome Measure:   NIH - Chronic Prostatitis Symptom Index (NIH - CPSI for Males):  Score:  Initial: 12    Pain: 6  Urinary: 1  QOL: 5 Most Recent: X (Date: -- )       Medical Necessity:   > Skilled intervention continues to be required due to above mentioned deficits.  Reason For Services/Other Comments:  > Patient continues to require skilled intervention due to above mentioned deficits.    Regarding Bennie Garrett's therapy, I certify that the treatment plan above will be carried out by a therapist or under their direction.  Thank you for this referral,  Hattie Glaser, PT     Referring Physician Signature: Winston Freeman DO _______________________________ Date _____________        Charge Capture  Appt Desk     Future Appointments   Date Time Provider Department Center   3/20/2024 10:00 AM Winston Freeman DO [id number] AMB   3/21/2024 10:00 AM [facility] ASSESSMENT  09 [facility]   4/4/2024  3:00 PM Winston Freeman DO [id number] [facility] AMB

## 2024-03-04 NOTE — PROGRESS NOTES
Bennie Lockwood   : 1950  Primary: Vaccn [employer] (Other)  Secondary:  [facility] @ [address] VENEGAS Heraclio  [facility] [address]  Phone: [phone]  Fax: [phone] Plan Frequency: 1x/week for 90 days  Plan of Care/Certification Expiration Date: 24        Plan of Care/Certification Expiration Date:  Plan of Care/Certification Expiration Date: 24    Frequency/Duration: Plan Frequency: 1x/week for 90 days      Time In/Out:   Time In: 1406  Time Out: 1456      PT Visit Info:    Progress Note Due Date: 24      Visit Count:  1    OUTPATIENT PHYSICAL THERAPY:   Treatment Note 3/4/2024       Episode  (PFPT)               Treatment Diagnosis:    Lack of coordination  Medical/Referring Diagnosis:    Malignant neoplasm of prostate (HCC)    Referring Physician:  Winston Freeman DO MD Orders:  PT Eval and Treat   Return MD Appt:     Date of Onset:  No data recorded   Allergies:   Patient has no known allergies.  Restrictions/Precautions:   None      Interventions Planned (Treatment may consist of any combination of the following):     See Assessment Note    Subjective Comments:     Initial Pain Level::     0/10  Post Session Pain Level:       0/10  Medications Last Reviewed:  3/4/2024  Updated Objective Findings:  See Evaluation Note from today  Treatment   THERAPEUTIC ACTIVITY: ( 30 minutes): Functional activity education regarding anatomy, pathology and role of pelvic floor muscle (PFM) function in relation to presenting symptoms and role of pelvic floor therapy in conservative treatment. and Functional activity education regarding pre and post operative bowel and bladder health including bladder irritants, changes in urinary symptoms, bowel management and toilet positioning and role and purpose of penile rehab for healing s/p RALP.    TA Educational Topic Notes Date Completed   Pathology/Anatomy/PFM Function Reviewed  3/4/24   Bladder health education

## 2024-03-20 ENCOUNTER — OFFICE VISIT (OUTPATIENT)
Dept: UROLOGY | Age: 74
End: 2024-03-20
Payer: OTHER GOVERNMENT

## 2024-03-20 DIAGNOSIS — C61 PROSTATE CANCER (HCC): Primary | ICD-10-CM

## 2024-03-20 PROCEDURE — 1123F ACP DISCUSS/DSCN MKR DOCD: CPT | Performed by: UROLOGY

## 2024-03-20 PROCEDURE — 99214 OFFICE O/P EST MOD 30 MIN: CPT | Performed by: UROLOGY

## 2024-03-20 NOTE — PROGRESS NOTES
[facility]  [address] [place] [address]  [phone]    Bennie Garrett  : 1950     HPI   73 y.o., male returns in follow up for CaP.  PSA was 6.6 (19% free) on 23. MRI on 23 showed a pirads 5 LM 2.7cm TZ lesion.   Fusion biopsies completed on 24.  Path showed Wheeling 4+3 in a LM core and Grayson 6 in 6 biopsy cores.  Vol was 26g.  Prior bilateral IHR and UHR.  Retired        History reviewed. No pertinent past medical history.  Past Surgical History:   Procedure Laterality Date    PROSTATE BIOPSY N/A 2024    PROSTATE BIOPSY FUSION performed by Winston Freeman DO at [facility] MAIN OR     No current outpatient medications on file.     No current facility-administered medications for this visit.     No Known Allergies  Social History     Socioeconomic History    Marital status:      Spouse name: Not on file    Number of children: Not on file    Years of education: Not on file    Highest education level: Not on file   Occupational History    Not on file   Tobacco Use    Smoking status: Never    Smokeless tobacco: Never   Vaping Use    Vaping Use: Never used   Substance and Sexual Activity    Alcohol use: Never    Drug use: Never    Sexual activity: Not on file   Other Topics Concern    Not on file   Social History Narrative    Not on file     Social Determinants of Health     Financial Resource Strain: Not on file   Food Insecurity: Not on file   Transportation Needs: Not on file   Physical Activity: Not on file   Stress: Not on file   Social Connections: Not on file   Intimate Partner Violence: Not on file   Housing Stability: Not on file     History reviewed. No pertinent family history.    Review of Systems  All systems reviewed and are negative at this time.    Physical Exam  There were no vitals taken for this visit.  General appearance - alert, well appearing, and in no distress  Mental status - alert and oriented  Eyes - extraocular eye

## 2024-03-21 ENCOUNTER — HOSPITAL ENCOUNTER (OUTPATIENT)
Dept: SURGERY | Age: 74
Discharge: HOME | End: 2024-03-21
Payer: OTHER GOVERNMENT

## 2024-03-21 ENCOUNTER — HOSPITAL ENCOUNTER (OUTPATIENT)
Dept: GENERAL RADIOLOGY | Age: 74
End: 2024-03-21
Payer: OTHER GOVERNMENT

## 2024-03-21 VITALS
TEMPERATURE: 97.1 F | DIASTOLIC BLOOD PRESSURE: 68 MMHG | OXYGEN SATURATION: 97 % | BODY MASS INDEX: 25.73 KG/M2 | WEIGHT: 173.7 LBS | HEART RATE: 69 BPM | RESPIRATION RATE: 16 BRPM | HEIGHT: 69 IN | SYSTOLIC BLOOD PRESSURE: 135 MMHG

## 2024-03-21 DIAGNOSIS — C61 PROSTATE CANCER (HCC): ICD-10-CM

## 2024-03-21 LAB
ANION GAP SERPL CALC-SCNC: 6 MMOL/L (ref 2–11)
APPEARANCE UR: CLEAR
APTT PPP: 27.6 SEC (ref 23.3–37.4)
BILIRUB UR QL: NEGATIVE
BUN SERPL-MCNC: 22 MG/DL (ref 8–23)
CALCIUM SERPL-MCNC: 9.9 MG/DL (ref 8.3–10.4)
CHLORIDE SERPL-SCNC: 107 MMOL/L (ref 103–113)
CO2 SERPL-SCNC: 27 MMOL/L (ref 21–32)
COLOR UR: NORMAL
CREAT SERPL-MCNC: 1.47 MG/DL (ref 0.8–1.5)
EKG ATRIAL RATE: 64 BPM
EKG DIAGNOSIS: NORMAL
EKG P AXIS: 41 DEGREES
EKG P-R INTERVAL: 236 MS
EKG Q-T INTERVAL: 384 MS
EKG QRS DURATION: 82 MS
EKG QTC CALCULATION (BAZETT): 396 MS
EKG R AXIS: 53 DEGREES
EKG T AXIS: 39 DEGREES
EKG VENTRICULAR RATE: 64 BPM
ERYTHROCYTE [DISTWIDTH] IN BLOOD BY AUTOMATED COUNT: 13.5 % (ref 11.9–14.6)
GLUCOSE SERPL-MCNC: 125 MG/DL (ref 65–100)
GLUCOSE UR STRIP.AUTO-MCNC: NEGATIVE MG/DL
HCT VFR BLD AUTO: 43 % (ref 41.1–50.3)
HGB BLD-MCNC: 14.9 G/DL (ref 13.6–17.2)
HGB UR QL STRIP: NEGATIVE
INR PPP: 1
KETONES UR QL STRIP.AUTO: NEGATIVE MG/DL
LEUKOCYTE ESTERASE UR QL STRIP.AUTO: NEGATIVE
MCH RBC QN AUTO: 30.9 PG (ref 26.1–32.9)
MCHC RBC AUTO-ENTMCNC: 34.7 G/DL (ref 31.4–35)
MCV RBC AUTO: 89.2 FL (ref 82–102)
NITRITE UR QL STRIP.AUTO: NEGATIVE
NRBC # BLD: 0 K/UL (ref 0–0.2)
PH UR STRIP: 5 (ref 5–9)
PLATELET # BLD AUTO: 205 K/UL (ref 150–450)
PMV BLD AUTO: 9.3 FL (ref 9.4–12.3)
POTASSIUM SERPL-SCNC: 4.2 MMOL/L (ref 3.5–5.1)
PROT UR STRIP-MCNC: NEGATIVE MG/DL
PROTHROMBIN TIME: 13.3 SEC (ref 11.3–14.9)
RBC # BLD AUTO: 4.82 M/UL (ref 4.23–5.6)
SODIUM SERPL-SCNC: 140 MMOL/L (ref 136–146)
SP GR UR REFRACTOMETRY: 1.01 (ref 1–1.02)
UROBILINOGEN UR QL STRIP.AUTO: 0.2 EU/DL (ref 0.2–1)
WBC # BLD AUTO: 6.5 K/UL (ref 4.3–11.1)

## 2024-03-21 PROCEDURE — 80048 BASIC METABOLIC PNL TOTAL CA: CPT

## 2024-03-21 PROCEDURE — 93005 ELECTROCARDIOGRAM TRACING: CPT

## 2024-03-21 PROCEDURE — 85027 COMPLETE CBC AUTOMATED: CPT

## 2024-03-21 PROCEDURE — 85610 PROTHROMBIN TIME: CPT

## 2024-03-21 PROCEDURE — 85730 THROMBOPLASTIN TIME PARTIAL: CPT

## 2024-03-21 PROCEDURE — 81003 URINALYSIS AUTO W/O SCOPE: CPT

## 2024-03-21 PROCEDURE — 87086 URINE CULTURE/COLONY COUNT: CPT

## 2024-03-21 PROCEDURE — 71046 X-RAY EXAM CHEST 2 VIEWS: CPT

## 2024-03-21 PROCEDURE — 93010 ELECTROCARDIOGRAM REPORT: CPT | Performed by: INTERNAL MEDICINE

## 2024-03-21 NOTE — PERIOP NOTE
labs within anesthesia guidelines, no follow-up required. Labs automatically routed to ordering provider via Epic documentation.

## 2024-03-21 NOTE — PERIOP NOTE
Patient verified name and     Order for consent  found in EHR and matches case posting; patient verified.     Type 3 surgery, Walk in assessment complete.    Labs per surgeon: chest xray, UC, UA, T&S s/h dos, PT/INR, CBC, BMP, APTT; results pending  Labs per anesthesia protocol: none  [id number]    Hospital approved surgical skin cleanser and instructions given per hospital policy.    Patient provided with and instructed on educational handouts including Guide to Surgery, Pain Management, Hand Hygiene, Blood Transfusion Education, and [place] [facility] Brochure.    Patient answered medical/surgical history questions at their best of ability. All prior to admission medications documented in [facility]. Original medication prescription bottle not visualized during patient appointment.     Patient instructed to hold all vitamins 7 days prior to surgery and NSAIDS 5 days prior to surgery, patient verbalized understanding.     Patient teach back successful and patient demonstrates knowledge of instructions.    PLEASE CONTINUE TAKING ALL PRESCRIPTION MEDICATIONS UP TO THE DAY OF SURGERY UNLESS OTHERWISE DIRECTED BELOW. You may take Tylenol, allergy,  and/or indigestion medications.     TAKE ONLY THESE MEDICATIONS ON THE DAY OF SURGERY               DISCONTINUE all vitamins and supplements 7 days prior to surgery. DISCONTINUE Non-Steroidal Anti-Inflammatory (NSAIDS) such as Advil and Aleve 5 days prior to surgery.     Home Medications to Hold- please continue all other medications except these.            Comments      On the day before surgery () please take 2 Tylenol in the morning and then again before bed. You may use either regular or extra strength.           Please do not bring home medications with you on the day of surgery unless otherwise directed by your nurse.  If you are instructed to bring home medications, please give them to your nurse as they will be administered by the nursing

## 2024-03-23 LAB
BACTERIA SPEC CULT: NORMAL
SERVICE CMNT-IMP: NORMAL

## 2024-03-27 ENCOUNTER — ANESTHESIA EVENT (OUTPATIENT)
Dept: SURGERY | Age: 74
End: 2024-03-27
Payer: OTHER GOVERNMENT

## 2024-03-28 ENCOUNTER — ANESTHESIA (OUTPATIENT)
Dept: SURGERY | Age: 74
End: 2024-03-28
Payer: OTHER GOVERNMENT

## 2024-03-28 ENCOUNTER — HOSPITAL ENCOUNTER (INPATIENT)
Age: 74
LOS: 1 days | Discharge: HOME | End: 2024-03-30
Attending: UROLOGY | Admitting: UROLOGY
Payer: OTHER GOVERNMENT

## 2024-03-28 DIAGNOSIS — C61 PROSTATE CANCER (HCC): Primary | ICD-10-CM

## 2024-03-28 LAB
ABO + RH BLD: NORMAL
BLOOD GROUP ANTIBODIES SERPL: NORMAL
HCT VFR BLD AUTO: 43.9 % (ref 41.1–50.3)
HGB BLD-MCNC: 14.4 G/DL (ref 13.6–17.2)
SPECIMEN EXP DATE BLD: NORMAL

## 2024-03-28 PROCEDURE — 2500000003 HC RX 250 WO HCPCS: Performed by: REGISTERED NURSE

## 2024-03-28 PROCEDURE — 38571 LAPAROSCOPY LYMPHADENECTOMY: CPT | Performed by: UROLOGY

## 2024-03-28 PROCEDURE — 7100000000 HC PACU RECOVERY - FIRST 15 MIN: Performed by: UROLOGY

## 2024-03-28 PROCEDURE — 3700000001 HC ADD 15 MINUTES (ANESTHESIA): Performed by: UROLOGY

## 2024-03-28 PROCEDURE — 6370000000 HC RX 637 (ALT 250 FOR IP): Performed by: ANESTHESIOLOGY

## 2024-03-28 PROCEDURE — 3700000000 HC ANESTHESIA ATTENDED CARE: Performed by: UROLOGY

## 2024-03-28 PROCEDURE — 6360000002 HC RX W HCPCS: Performed by: UROLOGY

## 2024-03-28 PROCEDURE — 88309 TISSUE EXAM BY PATHOLOGIST: CPT

## 2024-03-28 PROCEDURE — 2580000003 HC RX 258: Performed by: ANESTHESIOLOGY

## 2024-03-28 PROCEDURE — 6360000002 HC RX W HCPCS: Performed by: REGISTERED NURSE

## 2024-03-28 PROCEDURE — 7100000001 HC PACU RECOVERY - ADDTL 15 MIN: Performed by: UROLOGY

## 2024-03-28 PROCEDURE — 2709999900 HC NON-CHARGEABLE SUPPLY: Performed by: UROLOGY

## 2024-03-28 PROCEDURE — 0VT04ZZ RESECTION OF PROSTATE, PERCUTANEOUS ENDOSCOPIC APPROACH: ICD-10-PCS | Performed by: UROLOGY

## 2024-03-28 PROCEDURE — 86901 BLOOD TYPING SEROLOGIC RH(D): CPT

## 2024-03-28 PROCEDURE — 86900 BLOOD TYPING SEROLOGIC ABO: CPT

## 2024-03-28 PROCEDURE — 85014 HEMATOCRIT: CPT

## 2024-03-28 PROCEDURE — 07BC4ZX EXCISION OF PELVIS LYMPHATIC, PERCUTANEOUS ENDOSCOPIC APPROACH, DIAGNOSTIC: ICD-10-PCS | Performed by: UROLOGY

## 2024-03-28 PROCEDURE — 86850 RBC ANTIBODY SCREEN: CPT

## 2024-03-28 PROCEDURE — 3600000009 HC SURGERY ROBOT BASE: Performed by: UROLOGY

## 2024-03-28 PROCEDURE — 85018 HEMOGLOBIN: CPT

## 2024-03-28 PROCEDURE — 8E0W4CZ ROBOTIC ASSISTED PROCEDURE OF TRUNK REGION, PERCUTANEOUS ENDOSCOPIC APPROACH: ICD-10-PCS | Performed by: UROLOGY

## 2024-03-28 PROCEDURE — 3600000019 HC SURGERY ROBOT ADDTL 15MIN: Performed by: UROLOGY

## 2024-03-28 PROCEDURE — 88305 TISSUE EXAM BY PATHOLOGIST: CPT

## 2024-03-28 PROCEDURE — S2900 ROBOTIC SURGICAL SYSTEM: HCPCS | Performed by: UROLOGY

## 2024-03-28 PROCEDURE — 55866 LAPS SURG PRST8ECT RPBIC RAD: CPT | Performed by: UROLOGY

## 2024-03-28 PROCEDURE — 6370000000 HC RX 637 (ALT 250 FOR IP): Performed by: UROLOGY

## 2024-03-28 PROCEDURE — G0378 HOSPITAL OBSERVATION PER HR: HCPCS

## 2024-03-28 PROCEDURE — 2580000003 HC RX 258: Performed by: UROLOGY

## 2024-03-28 RX ORDER — SODIUM CHLORIDE 0.9 % (FLUSH) 0.9 %
5-40 SYRINGE (ML) INJECTION PRN
Status: DISCONTINUED | OUTPATIENT
Start: 2024-03-28 | End: 2024-03-28 | Stop reason: HOSPADM

## 2024-03-28 RX ORDER — OXYCODONE HYDROCHLORIDE 5 MG/1
5 TABLET ORAL
Status: DISCONTINUED | OUTPATIENT
Start: 2024-03-28 | End: 2024-03-28 | Stop reason: HOSPADM

## 2024-03-28 RX ORDER — BUPIVACAINE HYDROCHLORIDE 2.5 MG/ML
INJECTION, SOLUTION EPIDURAL; INFILTRATION; INTRACAUDAL PRN
Status: DISCONTINUED | OUTPATIENT
Start: 2024-03-28 | End: 2024-03-28 | Stop reason: HOSPADM

## 2024-03-28 RX ORDER — SODIUM CHLORIDE, SODIUM LACTATE, POTASSIUM CHLORIDE, CALCIUM CHLORIDE 600; 310; 30; 20 MG/100ML; MG/100ML; MG/100ML; MG/100ML
INJECTION, SOLUTION INTRAVENOUS CONTINUOUS
Status: DISCONTINUED | OUTPATIENT
Start: 2024-03-28 | End: 2024-03-28 | Stop reason: HOSPADM

## 2024-03-28 RX ORDER — IPRATROPIUM BROMIDE AND ALBUTEROL SULFATE 2.5; .5 MG/3ML; MG/3ML
1 SOLUTION RESPIRATORY (INHALATION)
Status: DISCONTINUED | OUTPATIENT
Start: 2024-03-28 | End: 2024-03-28 | Stop reason: HOSPADM

## 2024-03-28 RX ORDER — KETAMINE HYDROCHLORIDE 50 MG/ML
INJECTION, SOLUTION INTRAMUSCULAR; INTRAVENOUS PRN
Status: DISCONTINUED | OUTPATIENT
Start: 2024-03-28 | End: 2024-03-28 | Stop reason: SDUPTHER

## 2024-03-28 RX ORDER — ACETAMINOPHEN 500 MG
1000 TABLET ORAL ONCE
Status: COMPLETED | OUTPATIENT
Start: 2024-03-28 | End: 2024-03-28

## 2024-03-28 RX ORDER — HYDROMORPHONE HYDROCHLORIDE 2 MG/ML
0.5 INJECTION, SOLUTION INTRAMUSCULAR; INTRAVENOUS; SUBCUTANEOUS EVERY 10 MIN PRN
Status: DISCONTINUED | OUTPATIENT
Start: 2024-03-28 | End: 2024-03-28 | Stop reason: HOSPADM

## 2024-03-28 RX ORDER — DEXTROSE AND SODIUM CHLORIDE 5; .45 G/100ML; G/100ML
INJECTION, SOLUTION INTRAVENOUS CONTINUOUS
Status: DISCONTINUED | OUTPATIENT
Start: 2024-03-28 | End: 2024-03-30

## 2024-03-28 RX ORDER — DEXAMETHASONE SODIUM PHOSPHATE 4 MG/ML
INJECTION, SOLUTION INTRA-ARTICULAR; INTRALESIONAL; INTRAMUSCULAR; INTRAVENOUS; SOFT TISSUE PRN
Status: DISCONTINUED | OUTPATIENT
Start: 2024-03-28 | End: 2024-03-28 | Stop reason: SDUPTHER

## 2024-03-28 RX ORDER — ONDANSETRON 2 MG/ML
4 INJECTION INTRAMUSCULAR; INTRAVENOUS
Status: DISCONTINUED | OUTPATIENT
Start: 2024-03-28 | End: 2024-03-28 | Stop reason: HOSPADM

## 2024-03-28 RX ORDER — FENTANYL CITRATE 50 UG/ML
50 INJECTION, SOLUTION INTRAMUSCULAR; INTRAVENOUS EVERY 5 MIN PRN
Status: DISCONTINUED | OUTPATIENT
Start: 2024-03-28 | End: 2024-03-28 | Stop reason: HOSPADM

## 2024-03-28 RX ORDER — LIDOCAINE HYDROCHLORIDE 20 MG/ML
INJECTION, SOLUTION EPIDURAL; INFILTRATION; INTRACAUDAL; PERINEURAL PRN
Status: DISCONTINUED | OUTPATIENT
Start: 2024-03-28 | End: 2024-03-28 | Stop reason: SDUPTHER

## 2024-03-28 RX ORDER — FENTANYL CITRATE 50 UG/ML
INJECTION, SOLUTION INTRAMUSCULAR; INTRAVENOUS PRN
Status: DISCONTINUED | OUTPATIENT
Start: 2024-03-28 | End: 2024-03-28 | Stop reason: SDUPTHER

## 2024-03-28 RX ORDER — MORPHINE SULFATE 2 MG/ML
2 INJECTION, SOLUTION INTRAMUSCULAR; INTRAVENOUS
Status: DISCONTINUED | OUTPATIENT
Start: 2024-03-28 | End: 2024-03-30 | Stop reason: HOSPADM

## 2024-03-28 RX ORDER — ACETAMINOPHEN 325 MG/1
650 TABLET ORAL EVERY 4 HOURS PRN
Status: DISCONTINUED | OUTPATIENT
Start: 2024-03-28 | End: 2024-03-30 | Stop reason: HOSPADM

## 2024-03-28 RX ORDER — PROPOFOL 10 MG/ML
INJECTION, EMULSION INTRAVENOUS PRN
Status: DISCONTINUED | OUTPATIENT
Start: 2024-03-28 | End: 2024-03-28 | Stop reason: SDUPTHER

## 2024-03-28 RX ORDER — SODIUM CHLORIDE 0.9 % (FLUSH) 0.9 %
5-40 SYRINGE (ML) INJECTION EVERY 12 HOURS SCHEDULED
Status: DISCONTINUED | OUTPATIENT
Start: 2024-03-28 | End: 2024-03-28 | Stop reason: HOSPADM

## 2024-03-28 RX ORDER — NALOXONE HYDROCHLORIDE 0.4 MG/ML
INJECTION, SOLUTION INTRAMUSCULAR; INTRAVENOUS; SUBCUTANEOUS PRN
Status: DISCONTINUED | OUTPATIENT
Start: 2024-03-28 | End: 2024-03-28 | Stop reason: HOSPADM

## 2024-03-28 RX ORDER — HYDROCODONE BITARTRATE AND ACETAMINOPHEN 5; 325 MG/1; MG/1
1 TABLET ORAL EVERY 4 HOURS PRN
Status: DISCONTINUED | OUTPATIENT
Start: 2024-03-28 | End: 2024-03-30 | Stop reason: HOSPADM

## 2024-03-28 RX ORDER — ROCURONIUM BROMIDE 10 MG/ML
INJECTION, SOLUTION INTRAVENOUS PRN
Status: DISCONTINUED | OUTPATIENT
Start: 2024-03-28 | End: 2024-03-28 | Stop reason: SDUPTHER

## 2024-03-28 RX ORDER — HALOPERIDOL 5 MG/ML
1 INJECTION INTRAMUSCULAR
Status: DISCONTINUED | OUTPATIENT
Start: 2024-03-28 | End: 2024-03-28 | Stop reason: HOSPADM

## 2024-03-28 RX ORDER — ONDANSETRON 2 MG/ML
INJECTION INTRAMUSCULAR; INTRAVENOUS PRN
Status: DISCONTINUED | OUTPATIENT
Start: 2024-03-28 | End: 2024-03-28 | Stop reason: SDUPTHER

## 2024-03-28 RX ORDER — OXYBUTYNIN CHLORIDE 5 MG/1
5 TABLET ORAL 3 TIMES DAILY PRN
Status: DISCONTINUED | OUTPATIENT
Start: 2024-03-28 | End: 2024-03-30 | Stop reason: HOSPADM

## 2024-03-28 RX ORDER — MIDAZOLAM HYDROCHLORIDE 2 MG/2ML
2 INJECTION, SOLUTION INTRAMUSCULAR; INTRAVENOUS
Status: DISCONTINUED | OUTPATIENT
Start: 2024-03-28 | End: 2024-03-28 | Stop reason: HOSPADM

## 2024-03-28 RX ORDER — DOCUSATE SODIUM 100 MG/1
100 CAPSULE, LIQUID FILLED ORAL 2 TIMES DAILY
Status: DISCONTINUED | OUTPATIENT
Start: 2024-03-28 | End: 2024-03-30 | Stop reason: HOSPADM

## 2024-03-28 RX ORDER — LIDOCAINE HYDROCHLORIDE 10 MG/ML
1 INJECTION, SOLUTION INFILTRATION; PERINEURAL
Status: DISCONTINUED | OUTPATIENT
Start: 2024-03-28 | End: 2024-03-28 | Stop reason: HOSPADM

## 2024-03-28 RX ORDER — ONDANSETRON 2 MG/ML
4 INJECTION INTRAMUSCULAR; INTRAVENOUS EVERY 6 HOURS PRN
Status: DISCONTINUED | OUTPATIENT
Start: 2024-03-28 | End: 2024-03-30 | Stop reason: HOSPADM

## 2024-03-28 RX ADMIN — SUGAMMADEX 200 MG: 100 INJECTION, SOLUTION INTRAVENOUS at 11:52

## 2024-03-28 RX ADMIN — FENTANYL CITRATE 100 MCG: 50 INJECTION, SOLUTION INTRAMUSCULAR; INTRAVENOUS at 09:50

## 2024-03-28 RX ADMIN — DOCUSATE SODIUM 100 MG: 100 CAPSULE, LIQUID FILLED ORAL at 21:01

## 2024-03-28 RX ADMIN — DEXTROSE AND SODIUM CHLORIDE: 5; 450 INJECTION, SOLUTION INTRAVENOUS at 21:01

## 2024-03-28 RX ADMIN — SODIUM CHLORIDE, POTASSIUM CHLORIDE, SODIUM LACTATE AND CALCIUM CHLORIDE: 600; 310; 30; 20 INJECTION, SOLUTION INTRAVENOUS at 08:36

## 2024-03-28 RX ADMIN — KETAMINE HYDROCHLORIDE 10 MG: 50 INJECTION, SOLUTION INTRAMUSCULAR; INTRAVENOUS at 11:25

## 2024-03-28 RX ADMIN — ACETAMINOPHEN 1000 MG: 500 TABLET, FILM COATED ORAL at 08:38

## 2024-03-28 RX ADMIN — ONDANSETRON 4 MG: 2 INJECTION INTRAMUSCULAR; INTRAVENOUS at 09:59

## 2024-03-28 RX ADMIN — LIDOCAINE HYDROCHLORIDE 100 MG: 20 INJECTION, SOLUTION EPIDURAL; INFILTRATION; INTRACAUDAL; PERINEURAL at 09:50

## 2024-03-28 RX ADMIN — DEXTROSE AND SODIUM CHLORIDE: 5; 450 INJECTION, SOLUTION INTRAVENOUS at 14:13

## 2024-03-28 RX ADMIN — MORPHINE SULFATE 2 MG: 2 INJECTION, SOLUTION INTRAMUSCULAR; INTRAVENOUS at 14:29

## 2024-03-28 RX ADMIN — DEXAMETHASONE SODIUM PHOSPHATE 4 MG: 4 INJECTION INTRA-ARTICULAR; INTRALESIONAL; INTRAMUSCULAR; INTRAVENOUS; SOFT TISSUE at 09:59

## 2024-03-28 RX ADMIN — KETAMINE HYDROCHLORIDE 30 MG: 50 INJECTION, SOLUTION INTRAMUSCULAR; INTRAVENOUS at 10:23

## 2024-03-28 RX ADMIN — ACETAMINOPHEN 650 MG: 325 TABLET ORAL at 17:34

## 2024-03-28 RX ADMIN — PROPOFOL 200 MG: 10 INJECTION, EMULSION INTRAVENOUS at 09:50

## 2024-03-28 RX ADMIN — ROCURONIUM BROMIDE 10 MG: 10 INJECTION, SOLUTION INTRAVENOUS at 10:45

## 2024-03-28 RX ADMIN — DOCUSATE SODIUM 100 MG: 100 CAPSULE, LIQUID FILLED ORAL at 14:19

## 2024-03-28 RX ADMIN — ROCURONIUM BROMIDE 40 MG: 10 INJECTION, SOLUTION INTRAVENOUS at 09:50

## 2024-03-28 RX ADMIN — CEFAZOLIN 1000 MG: 1 INJECTION, POWDER, FOR SOLUTION INTRAMUSCULAR; INTRAVENOUS at 17:34

## 2024-03-28 RX ADMIN — Medication 2000 MG: at 09:47

## 2024-03-28 ASSESSMENT — PAIN DESCRIPTION - LOCATION
LOCATION: PENIS
LOCATION: ABDOMEN
LOCATION: ABDOMEN

## 2024-03-28 ASSESSMENT — PAIN SCALES - GENERAL
PAINLEVEL_OUTOF10: 5
PAINLEVEL_OUTOF10: 6
PAINLEVEL_OUTOF10: 7

## 2024-03-28 ASSESSMENT — PAIN DESCRIPTION - ORIENTATION
ORIENTATION: LOWER
ORIENTATION: LEFT;LOWER

## 2024-03-28 ASSESSMENT — PAIN - FUNCTIONAL ASSESSMENT
PAIN_FUNCTIONAL_ASSESSMENT: 0-10
PAIN_FUNCTIONAL_ASSESSMENT: 0-10
PAIN_FUNCTIONAL_ASSESSMENT: NONE - DENIES PAIN

## 2024-03-28 ASSESSMENT — PAIN DESCRIPTION - DESCRIPTORS
DESCRIPTORS: ACHING
DESCRIPTORS: ACHING

## 2024-03-28 NOTE — OP NOTE
[address]  [address]                            OPERATIVE REPORT      PATIENT NAME: MARGRET CAMARA        : 1950  MED REC NO: [id number]                       ROOM: [facility]  ACCOUNT NO: [id number]                       ADMIT DATE: 2024  PROVIDER: Winston Freeman DO    DATE OF SERVICE:  2024    PREOPERATIVE DIAGNOSES:  Prostate cancer.    POSTOPERATIVE DIAGNOSES:  Prostate cancer.    PROCEDURES PERFORMED:  Robotic-assisted laparoscopic radical prostatectomy and bilateral pelvic lymph node dissection.    SURGEON:  Winston Freeman DO    ASSISTANT:  None.    ANESTHESIA:  General.    ESTIMATED BLOOD LOSS:  75 mL.    SPECIMENS REMOVED:  Prostate and bilateral pelvic lymph nodes.    INTRAOPERATIVE FINDINGS:  Please see dictated operative note.     COMPLICATIONS:  None immediate.    IMPLANTS:  None.    INDICATIONS:  This is a 73-year-old gentleman who was recently diagnosed with Grayson 6 and 7 adenocarcinoma of the prostate on 2024.  His pre biopsy PSA was 6.6, and clinical stage is T1c.  All risks, benefits, and alternatives of the above-mentioned procedure have been reviewed, and he is willing to proceed at this time.    DESCRIPTION OF PROCEDURE:  The patient's consent was obtained.  The patient was brought back to the operating room, at which time he was placed in the supine position.  After the uneventful induction of general anesthesia, he was then placed in the low lithotomy position.  His genital and abdominal areas were prepped and draped, and a sterile field applied.  An 18-Finnish Adam catheter was placed to dependent drainage.  A small periumbilical incision was made, and a Veress needle was inserted into the abdominal cavity without event.  The abdomen was then insufflated with CO2.  Ports were then placed in a standard robotic prostatectomy fashion under direct vision.  The patient was then placed in

## 2024-03-28 NOTE — BRIEF OP NOTE
Brief Postoperative Note      Patient: Bennie Garrett  YOB: 1950  MRN: [id number]    Date of Procedure: 3/28/2024    Pre-Op Diagnosis Codes:     * Malignant neoplasm of prostate (HCC) [C61]    Post-Op Diagnosis: Same       Procedure:  RALP and bilateral PLND    Surgeon(s):  Joyce Goode DO    Assistant:  * No surgical staff found *    Anesthesia: General    Estimated Blood Loss (mL): 75cc    Complications: none immediate    Specimens:   ID Type Source Tests Collected by Time Destination   A : PROSTATE Tissue Prostate SURGICAL PATHOLOGY Joyce Goode DO 3/28/2024 1044    B : RIGHT PELVIC LYMPH NODE Tissue Lymph Node SURGICAL PATHOLOGY Joyce Goode DO 3/28/2024 1046    C : LEFT PELVIC LYMPH NODE Tissue Lymph Node SURGICAL PATHOLOGY Joyce Goode DO 3/28/2024 1048        Implants:  * No implants in log *      Drains:   Closed/Suction Drain RLQ Bulb (Active)       Urinary Catheter 03/28/24 2 Way (Active)       Findings: see op note      Electronically signed by JOYCE GOODE DO on 3/28/2024 at 12:33 PM

## 2024-03-28 NOTE — PROGRESS NOTES
Ambulated in wagner 250 ft. Adam draining blood tinged urine. TRAY charged, emptied 40 cc' s bloody drainage.

## 2024-03-28 NOTE — ANESTHESIA PROCEDURE NOTES
Airway  Date/Time: 3/28/2024 9:53 AM  Urgency: elective    Airway not difficult    General Information and Staff    Patient location during procedure: OR  Resident/CRNA: Mandie Berrios APRN - CRNA  Performed: resident/CRNA   Performed by: Mandie Berrios APRN - CRNA  Authorized by: Nikita Dacosta MD      Indications and Patient Condition  Indications for airway management: anesthesia  Spontaneous Ventilation: absent  Sedation level: deep  Preoxygenated: yes  Patient position: sniffing  MILS not maintained throughout  Mask difficulty assessment: vent by bag mask    Final Airway Details  Final airway type: endotracheal airway      Successful airway: ETT  Cuffed: yes   Successful intubation technique: direct laryngoscopy  Facilitating devices/methods: intubating stylet  Endotracheal tube insertion site: oral  Blade: Danielle  Blade size: #4  ETT size (mm): 8.0  Cormack-Lehane Classification: grade I - full view of glottis  Placement verified by: chest auscultation and capnometry   Measured from: lips  ETT to lips (cm): 22  Number of attempts at approach: 1  Ventilation between attempts: bag mask  Number of other approaches attempted: 0    no

## 2024-03-28 NOTE — PROGRESS NOTES
TRANSFER - IN REPORT:    Verbal report received from Morenita on Bennie Lockwood   being received from PACU for routine post-op      Report consisted of patient's Situation, Background, Assessment and   Recommendations(SBAR).     Information from the following report(s) Nurse Handoff Report, Surgery Report, Intake/Output, MAR, Recent Results, and Med Rec Status was reviewed with the receiving nurse.    Opportunity for questions and clarification was provided.      Assessment completed upon patient's arrival to unit and care assumed.

## 2024-03-28 NOTE — ANESTHESIA PRE PROCEDURE
Department of Anesthesiology  Preprocedure Note       Name:  Bennie Garrett   Age:  73 y.o.  :  1950                                          MRN:  [id number]         Date:  3/28/2024      Surgeon: Surgeon(s):  Winston Freeman DO    Procedure: Procedure(s):  PROSTATECTOMY LAPAROSCOPIC ROBOTIC/POSSIBLE BILATERAL LYMPH NODE DISSECTION    Medications prior to admission:   Prior to Admission medications    Not on File       Current medications:    Current Facility-Administered Medications   Medication Dose Route Frequency Provider Last Rate Last Admin   • lidocaine 1 % injection 1 mL  1 mL IntraDERmal Once PRN Nikita Dacosta MD       • famotidine (PEPCID) 20 mg in sodium chloride (PF) 0.9 % 10 mL injection  20 mg IntraVENous Once PRN Nikita Dacosta MD       • lactated ringers IV soln infusion   IntraVENous Continuous Nikita Dacosta  mL/hr at 24 0836 New Bag at 24 0836   • sodium chloride flush 0.9 % injection 5-40 mL  5-40 mL IntraVENous 2 times per day Nikita Dacosta MD       • sodium chloride flush 0.9 % injection 5-40 mL  5-40 mL IntraVENous PRN Nikita Dacosta MD       • midazolam PF (VERSED) injection 2 mg  2 mg IntraVENous Once PRN Nikita Dacosta MD       • ipratropium 0.5 mg-albuterol 2.5 mg (DUONEB) nebulizer solution 1 Dose  1 Dose Inhalation Once PRN Nikita Dacosta MD       • ceFAZolin (ANCEF) 2000 mg in sterile water 20 mL IV syringe  2,000 mg IntraVENous On Call to OR Winston Freeman DO           Allergies:  No Known Allergies    Problem List:    Patient Active Problem List   Diagnosis Code   • Elevated prostate specific antigen (PSA) R97.20   • Malignant neoplasm of prostate (HCC) C61       Past Medical History:  History reviewed. No pertinent past medical history.    Past Surgical History:        Procedure Laterality Date   • PROSTATE BIOPSY N/A 2024    PROSTATE BIOPSY FUSION performed by Winston Freeman DO at [facility] MAIN OR       Social History:

## 2024-03-28 NOTE — ANESTHESIA POSTPROCEDURE EVALUATION
Department of Anesthesiology  Postprocedure Note    Patient: Bennie Willister  MRN: [id number]  YOB: 1950  Date of evaluation: 3/28/2024    Procedure Summary       Date: 03/28/24 Room / Location: [facility] MAIN OR  / [facility] MAIN OR    Anesthesia Start: 0943 Anesthesia Stop: 1204    Procedure: PROSTATECTOMY LAPAROSCOPIC ROBOTIC/POSSIBLE BILATERAL LYMPH NODE DISSECTION (Abdomen) Diagnosis:       Malignant neoplasm of prostate (HCC)      (Malignant neoplasm of prostate (HCC) [C61])    Providers: Winston Freeman DO Responsible Provider: Nikita Dacosta MD    Anesthesia Type: general ASA Status: 3            Anesthesia Type: No value filed.    Joshua Phase I: Joshua Score: 9    Joshua Phase II:      Anesthesia Post Evaluation    Patient location during evaluation: PACU  Patient participation: complete - patient participated  Level of consciousness: awake and alert  Airway patency: patent  Nausea & Vomiting: no nausea and no vomiting  Cardiovascular status: hemodynamically stable  Respiratory status: acceptable, nonlabored ventilation and spontaneous ventilation  Hydration status: euvolemic  Comments: BP (!) 143/70   Pulse 67   Temp 97.2 °F (36.2 °C) (Temporal)   Resp 16   SpO2 100%     Multimodal analgesia pain management approach  Pain management: adequate and satisfactory to patient    No notable events documented.

## 2024-03-28 NOTE — PERIOP NOTE
TRANSFER - OUT REPORT:    Verbal report given to Asya CHATMAN on Bennie Garrett  being transferred to [facility] for routine progression of patient care       Report consisted of patient’s Situation, Background, Assessment and   Recommendations(SBAR).     Information from the following report(s) Nurse Handoff Report, Adult Overview, Surgery Report, Intake/Output, MAR, Recent Results, Cardiac Rhythm NSR, Alarm Parameters, Procedure Verification, and Neuro Assessment was reviewed with the receiving nurse.    Lines:   Peripheral IV 03/28/24 Left;Posterior Forearm (Active)   Site Assessment Clean, dry & intact 03/28/24 1255   Line Status Flushed;Brisk blood return;Infusing 03/28/24 1255   Line Care Connections checked and tightened 03/28/24 1255   Phlebitis Assessment No symptoms 03/28/24 1255   Infiltration Assessment 0 03/28/24 1255   Alcohol Cap Used Yes 03/28/24 1255   Dressing Status Clean, dry & intact 03/28/24 1255   Dressing Type Transparent 03/28/24 1255        Opportunity for questions and clarification was provided.      Patient transported with:   O2 @ 2 liters  Tech    VTE prophylaxis orders have been written for Bennie Garrett.    Patient and family given floor number and nurses name.  Family updated re: pt status after security code verified.

## 2024-03-29 LAB
ANION GAP SERPL CALC-SCNC: 6 MMOL/L (ref 2–11)
BUN SERPL-MCNC: 15 MG/DL (ref 8–23)
CALCIUM SERPL-MCNC: 8.5 MG/DL (ref 8.3–10.4)
CHLORIDE SERPL-SCNC: 106 MMOL/L (ref 103–113)
CO2 SERPL-SCNC: 26 MMOL/L (ref 21–32)
CREAT SERPL-MCNC: 1.3 MG/DL (ref 0.8–1.5)
GLUCOSE SERPL-MCNC: 168 MG/DL (ref 65–100)
HCT VFR BLD AUTO: 36.8 % (ref 41.1–50.3)
HGB BLD-MCNC: 12.9 G/DL (ref 13.6–17.2)
POTASSIUM SERPL-SCNC: 4.1 MMOL/L (ref 3.5–5.1)
SODIUM SERPL-SCNC: 138 MMOL/L (ref 136–146)

## 2024-03-29 PROCEDURE — 1100000000 HC RM PRIVATE

## 2024-03-29 PROCEDURE — 6360000002 HC RX W HCPCS: Performed by: UROLOGY

## 2024-03-29 PROCEDURE — 85018 HEMOGLOBIN: CPT

## 2024-03-29 PROCEDURE — 85014 HEMATOCRIT: CPT

## 2024-03-29 PROCEDURE — 80048 BASIC METABOLIC PNL TOTAL CA: CPT

## 2024-03-29 PROCEDURE — 36415 COLL VENOUS BLD VENIPUNCTURE: CPT

## 2024-03-29 PROCEDURE — 2580000003 HC RX 258: Performed by: PHYSICIAN ASSISTANT

## 2024-03-29 PROCEDURE — 6370000000 HC RX 637 (ALT 250 FOR IP): Performed by: UROLOGY

## 2024-03-29 PROCEDURE — 99024 POSTOP FOLLOW-UP VISIT: CPT | Performed by: PHYSICIAN ASSISTANT

## 2024-03-29 PROCEDURE — 2580000003 HC RX 258: Performed by: UROLOGY

## 2024-03-29 RX ADMIN — DEXTROSE AND SODIUM CHLORIDE: 5; 450 INJECTION, SOLUTION INTRAVENOUS at 10:55

## 2024-03-29 RX ADMIN — HYDROCODONE BITARTRATE AND ACETAMINOPHEN 1 TABLET: 5; 325 TABLET ORAL at 10:55

## 2024-03-29 RX ADMIN — CEFAZOLIN 1000 MG: 1 INJECTION, POWDER, FOR SOLUTION INTRAMUSCULAR; INTRAVENOUS at 02:52

## 2024-03-29 RX ADMIN — HYDROCODONE BITARTRATE AND ACETAMINOPHEN 1 TABLET: 5; 325 TABLET ORAL at 19:05

## 2024-03-29 RX ADMIN — DOCUSATE SODIUM 100 MG: 100 CAPSULE, LIQUID FILLED ORAL at 19:05

## 2024-03-29 RX ADMIN — CEFAZOLIN 1000 MG: 1 INJECTION, POWDER, FOR SOLUTION INTRAMUSCULAR; INTRAVENOUS at 10:30

## 2024-03-29 RX ADMIN — DEXTROSE AND SODIUM CHLORIDE: 5; 450 INJECTION, SOLUTION INTRAVENOUS at 23:26

## 2024-03-29 RX ADMIN — DOCUSATE SODIUM 100 MG: 100 CAPSULE, LIQUID FILLED ORAL at 10:37

## 2024-03-29 RX ADMIN — HYDROCODONE BITARTRATE AND ACETAMINOPHEN 1 TABLET: 5; 325 TABLET ORAL at 06:20

## 2024-03-29 RX ADMIN — DEXTROSE AND SODIUM CHLORIDE: 5; 450 INJECTION, SOLUTION INTRAVENOUS at 03:56

## 2024-03-29 ASSESSMENT — PAIN DESCRIPTION - DESCRIPTORS
DESCRIPTORS: ACHING
DESCRIPTORS: SHARP
DESCRIPTORS: TENDER;TIGHTNESS

## 2024-03-29 ASSESSMENT — PAIN DESCRIPTION - ORIENTATION: ORIENTATION: MID

## 2024-03-29 ASSESSMENT — PAIN DESCRIPTION - LOCATION
LOCATION: ABDOMEN

## 2024-03-29 ASSESSMENT — PAIN - FUNCTIONAL ASSESSMENT
PAIN_FUNCTIONAL_ASSESSMENT: ACTIVITIES ARE NOT PREVENTED
PAIN_FUNCTIONAL_ASSESSMENT: PREVENTS OR INTERFERES SOME ACTIVE ACTIVITIES AND ADLS

## 2024-03-29 ASSESSMENT — PAIN SCALES - GENERAL
PAINLEVEL_OUTOF10: 6
PAINLEVEL_OUTOF10: 6
PAINLEVEL_OUTOF10: 3
PAINLEVEL_OUTOF10: 6

## 2024-03-29 NOTE — PLAN OF CARE
Problem: Pain  Goal: Verbalizes/displays adequate comfort level or baseline comfort level  3/29/2024 1940 by Billy Fajardo RN  Outcome: Progressing  3/29/2024 0611 by Ayanna Goldman RN  Outcome: Progressing     Problem: Discharge Planning  Goal: Discharge to home or other facility with appropriate resources  3/29/2024 1940 by Billy Fajardo, RN  Outcome: Progressing  3/29/2024 0611 by Ayanna Goldman RN  Outcome: Progressing     Problem: ABCDS Injury Assessment  Goal: Absence of physical injury  3/29/2024 1940 by Billy Fajardo RN  Outcome: Progressing  3/29/2024 0611 by Ayanna Goldman RN  Outcome: Progressing     Problem: Safety - Adult  Goal: Free from fall injury  Outcome: Progressing

## 2024-03-29 NOTE — PLAN OF CARE
Problem: Pain  Goal: Verbalizes/displays adequate comfort level or baseline comfort level  3/29/2024 0611 by Ayanna Goldman RN  Outcome: Progressing  3/28/2024 2105 by Ayanna Goldman RN  Outcome: Progressing     Problem: Discharge Planning  Goal: Discharge to home or other facility with appropriate resources  3/29/2024 0611 by Ayanna Goldman RN  Outcome: Progressing  3/28/2024 2105 by Ayanna Goldman RN  Outcome: Progressing     Problem: ABCDS Injury Assessment  Goal: Absence of physical injury  3/29/2024 0611 by Ayanna Goldman RN  Outcome: Progressing  3/28/2024 2105 by Ayanna Goldman RN  Outcome: Progressing

## 2024-03-29 NOTE — PROGRESS NOTES
Passing flatus however abdomen still distended. Waiting to advance diet until AM. Ambulated in wagner x 4 today. Tolerated well. Pain managed with Norco.

## 2024-03-29 NOTE — PROGRESS NOTES
Admit Date: 3/28/2024    Subjective:     Patient states pain well controlled. Denies flatus    Objective:     Patient Vitals for the past 8 hrs:   BP Temp Temp src Pulse Resp SpO2   03/29/24 0735 (!) 118/56 98.1 °F (36.7 °C) Oral 70 18 95 %   03/29/24 0620 -- -- -- -- 22 --   03/29/24 0353 114/63 98.1 °F (36.7 °C) -- 70 17 97 %     No intake/output data recorded.  03/27 1901 - 03/29 0700  In: 700 [I.V.:700]  Out: 3120 [Urine:2900; Drains:70]    Physical Exam:  GENERAL ASSESSMENT: alert, oriented to person, place and time, no acute distress and no anxiety, depression or agitation  Chest: Easy work of breathing  CVS exam: RRR  ABDOMEN: soft, NTND  Neurological exam reveals alert, oriented, normal speech, no focal findings or movement disorder noted.  FEMALE GENITOURINARY EXAM: not done  MALE GENITAL EXAM: bennett draining clear yellow urine        Data Review   Recent Results (from the past 24 hour(s))   Hemoglobin and Hematocrit    Collection Time: 03/28/24 12:44 PM   Result Value Ref Range    Hemoglobin 14.4 13.6 - 17.2 g/dL    Hematocrit 43.9 41.1 - 50.3 %   Basic Metabolic Panel    Collection Time: 03/29/24  5:51 AM   Result Value Ref Range    Sodium 138 136 - 146 mmol/L    Potassium 4.1 3.5 - 5.1 mmol/L    Chloride 106 103 - 113 mmol/L    CO2 26 21 - 32 mmol/L    Anion Gap 6 2 - 11 mmol/L    Glucose 168 (H) 65 - 100 mg/dL    BUN 15 8 - 23 MG/DL    Creatinine 1.30 0.8 - 1.5 MG/DL    Est, Glom Filt Rate 58 (L) >60 ml/min/1.73m2    Calcium 8.5 8.3 - 10.4 MG/DL   Hemoglobin and Hematocrit    Collection Time: 03/29/24  5:51 AM   Result Value Ref Range    Hemoglobin 12.9 (L) 13.6 - 17.2 g/dL    Hematocrit 36.8 (L) 41.1 - 50.3 %       No results found.      Assessment:     Principal Problem:    Malignant neoplasm of prostate (HCC)  Active Problems:    Prostate cancer (HCC)  Resolved Problems:    * No resolved hospital problems. *    S/P RALP POD1. No flatus yet. TRAY output 30cc overnight  Plan:   Remove TRAY

## 2024-03-29 NOTE — CARE COORDINATION
03/29/24 0930   Service Assessment   Patient Orientation Alert and Oriented   Cognition Alert   History Provided By Patient   Primary Caregiver Self   Support Systems Spouse/Significant Other   Patient's Healthcare Decision Maker is: Legal Next of Kin   PCP Verified by CM Yes   Last Visit to PCP Within last 3 months   Prior Functional Level Independent in ADLs/IADLs   Current Functional Level Independent in ADLs/IADLs   Can patient return to prior living arrangement Yes   Ability to make needs known: Good   Family able to assist with home care needs: Yes   Would you like for me to discuss the discharge plan with any other family members/significant others, and if so, who? No   Financial [place]   Community Resources None   Social/Functional History   Lives With Spouse   Type of Home House   Condition of Participation: Discharge Planning   The Plan for Transition of Care is related to the following treatment goals: return home with spouse   The Patient and/or Patient Representative was provided with a Choice of Provider? Patient   The Patient and/Or Patient Representative agree with the Discharge Plan? Yes   Freedom of Choice list was provided with basic dialogue that supports the patient's individualized plan of care/goals, treatment preferences, and shares the quality data associated with the providers?  Yes     Assessment completed with patient in room this AM. Patient lives with spouse. He is IND with all ADLs at baseline. Demographics and PCP confirmed. Patient uses the [facility] clinic. Discharge plan is to return home. No anticipated discharge needs.    Corey AVILA, ACM  [place]

## 2024-03-30 VITALS
WEIGHT: 166 LBS | SYSTOLIC BLOOD PRESSURE: 119 MMHG | HEIGHT: 69 IN | HEART RATE: 68 BPM | TEMPERATURE: 98.6 F | RESPIRATION RATE: 20 BRPM | DIASTOLIC BLOOD PRESSURE: 62 MMHG | OXYGEN SATURATION: 96 % | BODY MASS INDEX: 24.59 KG/M2

## 2024-03-30 PROCEDURE — 6370000000 HC RX 637 (ALT 250 FOR IP): Performed by: UROLOGY

## 2024-03-30 RX ORDER — HYDROCODONE BITARTRATE AND ACETAMINOPHEN 5; 325 MG/1; MG/1
1 TABLET ORAL EVERY 8 HOURS PRN
Qty: 20 TABLET | Refills: 0 | Status: SHIPPED | OUTPATIENT
Start: 2024-03-30 | End: 2024-04-06

## 2024-03-30 RX ORDER — SULFAMETHOXAZOLE AND TRIMETHOPRIM 800; 160 MG/1; MG/1
1 TABLET ORAL 2 TIMES DAILY
Qty: 17 TABLET | Refills: 0 | Status: SHIPPED | OUTPATIENT
Start: 2024-03-30 | End: 2024-04-06

## 2024-03-30 RX ORDER — SENNA AND DOCUSATE SODIUM 50; 8.6 MG/1; MG/1
1 TABLET, FILM COATED ORAL DAILY
Qty: 30 TABLET | Refills: 0 | Status: SHIPPED | OUTPATIENT
Start: 2024-03-30

## 2024-03-30 RX ADMIN — DOCUSATE SODIUM 100 MG: 100 CAPSULE, LIQUID FILLED ORAL at 08:56

## 2024-03-30 NOTE — PROGRESS NOTES
Urology Progress Note    Admit Date: 3/28/2024    Subjective:     Patient has no new complaints. + flatus; wants to go home.     Objective:     Patient Vitals for the past 8 hrs:   BP Temp Temp src Pulse Resp SpO2   03/30/24 0732 119/62 98.6 °F (37 °C) Oral 68 20 96 %   03/30/24 0415 117/66 98.8 °F (37.1 °C) Oral 73 18 96 %     No intake/output data recorded.  03/28 1901 - 03/30 0700  In: 240 [P.O.:240]  Out: 6310 [Urine:6250; Drains:60]    Physical Exam:     Awake, alert, and oriented  Lungs no JVD.  Breathing is  non-labored; no audible wheezing.    Heart regular, normal perfusion  Abd soft, nt, nd  UOP clear      Data Review No results found for this or any previous visit (from the past 24 hour(s)).        Assessment:     Principal Problem:    Malignant neoplasm of prostate (HCC)  Active Problems:    Prostate cancer (HCC)  Resolved Problems:    * No resolved hospital problems. *      Pod 2 from ralp.  Doing well  Plan:     -home with bennett  -fu in 1-2 weeks for bennett removal      Will MD Luis    [facility]  [facility]

## 2024-03-30 NOTE — DISCHARGE INSTRUCTIONS
My office will schedule your follow-up appointment.    Please call our office ([phone]) or return to ED if you experience fever > 101.5, severe pain, persistent nausea/vomiting, excessive bleeding, inability to urinate, or other concerns.     Home with bennett; provide with leg bad as well.     Discussed with the patient and all questioned fully answered. He will call [place] with any questions or return to Ed

## 2024-03-30 NOTE — PROGRESS NOTES
Instructed pt on catheter care, how to clean with soap and water, change to leg bag and back to bennett bag at night and when around house. Verbalizes understanding. Discharge instructions given, Denies questions. Taken down in wheelchair and placed in car with wife.

## 2024-03-30 NOTE — CARE COORDINATION
Pt is for discharge home today with spouse. No other needs/supportive care orders recieved for CM at this time.       03/29/24 2365   Service Assessment   Patient Orientation Alert and Oriented   Cognition Alert   History Provided By Patient   Primary Caregiver Self   Support Systems Spouse/Significant Other   Patient's Healthcare Decision Maker is: Legal Next of Kin   PCP Verified by CM Yes   Last Visit to PCP Within last 3 months   Prior Functional Level Independent in ADLs/IADLs   Current Functional Level Independent in ADLs/IADLs   Can patient return to prior living arrangement Yes   Ability to make needs known: Good   Family able to assist with home care needs: Yes   Would you like for me to discuss the discharge plan with any other family members/significant others, and if so, who? No   [place]   Community Resources None   Social/Functional History   Lives With Spouse   Type of Home House   Services At/After Discharge   Transition of Care Consult (CM Consult) Discharge Planning   Services At/After Discharge None    Resource Information Provided? No   Mode of Transport at Discharge Other (see comment)  (Roundtrip)   Confirm Follow Up Transport Family   Condition of Participation: Discharge Planning   The Plan for Transition of Care is related to the following treatment goals: Pt will return home at discharge.   The Patient and/or Patient Representative was provided with a Choice of Provider? Patient   The Patient and/Or Patient Representative agree with the Discharge Plan? Yes   Freedom of Choice list was provided with basic dialogue that supports the patient's individualized plan of care/goals, treatment preferences, and shares the quality data associated with the providers?  Yes

## 2024-03-30 NOTE — DISCHARGE SUMMARY
Physician Discharge Summary    Name: Bennie Garrett  Medical Record Number: [id number]       Account Number:  [id number]  YOB: 1950                         Age:  73 y.o.    Admit date:  3/28/2024                    Discharge date:  3/30/24    Attending Physician: Missael Smith MD            Service: urology    Physician Summary completed by: Missael Smith MD    Reason for hospitalization: prostate cancer    Significant PMH:   History reviewed. No pertinent past medical history.    Allergies:   No Known Allergies    Brief Hospital Course:    The patient was admitted and had the procedure listed below performed without difficulty.  Her hospital course progressed as expected.  No acute events occurred throughout her hospital stay.  She was discharged in stable condition.     Admission Physical Exam notable for:    NA    Admission Lab/Radiology studies notable for:   Prostate cancer    Surgical Procedures:  RALP    Condition at Discharge: stable    Discharge Diagnoses:     Malignant neoplasm of prostate (HCC) [C61]  Prostate cancer (HCC) [C61]    Significant Diagnostic Studies and Procedures:  Noted in brief hospital course.    Patient Disposition: home       Patient instructions/medications:     Medication List      You have not been prescribed any medications.          Follow Up Instructions:  No follow-ups on file.     Pending items needing follow up: Bennie Garrett was instructed to follow up as indicated above.    Signed:    Missael Smith MD, MPH, EDDIE    [facility] [place] [address]  Phone: [phone]  Fax: [phone]    cc:  Primary Care Physician:  Verified  Referring physicians:    Additional provider(s):

## 2024-04-01 ENCOUNTER — CARE COORDINATION (OUTPATIENT)
Dept: CARE COORDINATION | Facility: CLINIC | Age: 74
End: 2024-04-01

## 2024-04-01 DIAGNOSIS — C61 PROSTATE CANCER (HCC): Primary | ICD-10-CM

## 2024-04-01 NOTE — CARE COORDINATION
2nd discharge call attempt made.  Chart reviewed. Assisted by .  Left message.  GERRY Cummins RN   Care Transitions Initial Follow Up Call    Call within 2 business days of discharge: Yes    Patient Current Location:  Home: [address] [place] [address]    Care Transition Nurse contacted the patient by telephone to perform post hospital discharge assessment. Verified name and  with patient as identifiers. Provided introduction to self, and explanation of the Care Transition Nurse role.     Patient: Bennie Garrett Patient : 1950   MRN: [id number]  Reason for Admission: Prostate cancer   Discharge Date: 3/30/24 RARS: Readmission Risk Score: 5.5      Last Discharge Facility       Date Complaint Diagnosis Description Type Department Provider    3/28/24  Prostate cancer (HCC) Admission (Discharged) [id number] Winston Freeman, DO            Was this an external facility discharge? No Discharge Facility:     Challenges to be reviewed by the provider   Additional needs identified to be addressed with provider: No  none               Method of communication with provider: none.    Patient reports they are doing ok.Upcoming appointment with Uro. Currently has an indwelling catheter to be removed on 2024. No concerns or questions at this time.   PCP is with     Care Transition Nurse reviewed discharge instructions with patient who verbalized understanding. The patient was given an opportunity to ask questions and does not have any further questions or concerns at this time. Were discharge instructions available to patient? Yes. Reviewed appropriate site of care based on symptoms and resources available to patient including: PCP  Specialist  When to call 911. The patient agrees to contact the PCP office for questions related to their healthcare.     Advance Care Planning:   Does patient have an Advance Directive:  no Acp docs .    Medication reconciliation was performed with patient, who verbalizes understanding of administration of home medications. Medications reviewed, 1111F entered:

## 2024-04-04 ENCOUNTER — OFFICE VISIT (OUTPATIENT)
Dept: UROLOGY | Age: 74
End: 2024-04-04

## 2024-04-04 DIAGNOSIS — C61 PROSTATE CANCER (HCC): Primary | ICD-10-CM

## 2024-04-04 PROCEDURE — 99024 POSTOP FOLLOW-UP VISIT: CPT | Performed by: UROLOGY

## 2024-04-04 RX ORDER — TADALAFIL 20 MG/1
20 TABLET ORAL PRN
Qty: 20 TABLET | Refills: 6 | Status: SHIPPED | OUTPATIENT
Start: 2024-04-04

## 2024-04-04 NOTE — PROGRESS NOTES
[facility]  [address] [place] [address]  [phone]    Bennie Lockwood [place]  : 1950     HPI   73 y.o., male returns in follow up for CaP.  S/P RALP on 3/28/24.  Path showed [place] 3+4, T2Nx with a focal L posterior positive margin.  He has done well post op.      History reviewed. No pertinent past medical history.  Past Surgical History:   Procedure Laterality Date    PROSTATE BIOPSY N/A 2024    PROSTATE BIOPSY FUSION performed by Winston Freeman DO at [facility] MAIN OR    PROSTATECTOMY N/A 3/28/2024    PROSTATECTOMY LAPAROSCOPIC ROBOTIC/POSSIBLE BILATERAL LYMPH NODE DISSECTION performed by Winston Freeman DO at [facility] MAIN OR     Current Outpatient Medications   Medication Sig Dispense Refill    tadalafil (CIALIS) 20 MG tablet Take 1 tablet by mouth as needed for Erectile Dysfunction 20 tablet 6    sulfamethoxazole-trimethoprim (BACTRIM DS;SEPTRA DS) 800-160 MG per tablet Take 1 tablet by mouth 2 times daily for 7 days 17 tablet 0    sennosides-docusate sodium (SENOKOT-S) 8.6-50 MG tablet Take 1 tablet by mouth daily 30 tablet 0    HYDROcodone-acetaminophen (NORCO) 5-325 MG per tablet Take 1 tablet by mouth every 8 hours as needed for Pain for up to 7 days. Intended supply: 3 days. Take lowest dose possible to manage pain Max Daily Amount: 3 tablets 20 tablet 0     No current facility-administered medications for this visit.     No Known Allergies  Social History     Socioeconomic History    Marital status:      Spouse name: Not on file    Number of children: Not on file    Years of education: Not on file    Highest education level: Not on file   Occupational History    Not on file   Tobacco Use    Smoking status: Never    Smokeless tobacco: Never   Vaping Use    Vaping Use: Never used   Substance and Sexual Activity    Alcohol use: Never    Drug use: Never    Sexual activity: Not on file   Other Topics Concern    Not on file   Social History Narrative    Not on

## 2024-04-10 ENCOUNTER — CARE COORDINATION (OUTPATIENT)
Dept: CARE COORDINATION | Facility: CLINIC | Age: 74
End: 2024-04-10

## 2024-04-10 NOTE — CARE COORDINATION
Care Transitions Follow Up Call    Patient Current Location:  Home: [address] [place] [address]    LP Care Coordinator contacted the patient by telephone to follow up after admission on 2024.  Verified name and  with patient as identifiers.    Patient: Bennie Garrett  Patient : 1950   MRN: [id number]  Reason for Admission: radical prostatectomy  Discharge Date: 3/30/24 RARS: Readmission Risk Score: 5.5      Needs to be reviewed by the provider   Additional needs identified to be addressed with provider: No  none             Method of communication with provider: none.    Addressed changes since last contact:  none  Discussed follow-up appointments. If no appointment was previously scheduled, appointment scheduling offered: Yes.   Is follow up appointment scheduled within 7 days of discharge? Yes.    Follow Up  Future Appointments   Date Time Provider Department Center   2024  8:00 AM Hattie Glaser, PT [facility]   2024  8:00 AM Hattie Glaser, PT [facility]   2024  8:00 AM Hattie Glaser, PT [facility]   2024  8:00 AM Hattie Glaser, PT [facility]   2024  9:00 AM Winston Freeman DO [id number] [facility] AMB     External follow up appointment(s): na    LPN Care Coordinator reviewed discharge instructions, medical action plan, and red flags with patient and discussed any barriers to care and/or understanding of plan of care after discharge. Discussed appropriate site of care based on symptoms and resources available to patient including: PCP  Specialist  Urgent care clinics  When to call 911  [employer]. The patient agrees to contact the PCP office for questions related to their healthcare.     Advance Care Planning:   not on file.     Patients top risk factors for readmission: medical condition-SBO, GERD, Hypothyroidism, PAYAL, Prostate CA, CKD3, COVID-19  Interventions to address risk factors: Obtained and reviewed discharge summary and/or

## 2024-04-17 ENCOUNTER — CARE COORDINATION (OUTPATIENT)
Dept: CARE COORDINATION | Facility: CLINIC | Age: 74
End: 2024-04-17

## 2024-04-17 NOTE — CARE COORDINATION
Care Transitions Follow Up Call    Patient Current Location:  Home: [address] [place] [address]    [facility] Care Coordinator contacted the patient by telephone to follow up after admission on 2024.  Verified name and  with patient as identifiers.    Patient: Bennie Garrett  Patient : 1950   MRN: [id number]  Reason for Admission: radical prostatectomy  Discharge Date: 3/30/24 RARS: Readmission Risk Score: 5.5      Needs to be reviewed by the provider   Additional needs identified to be addressed with provider: No  none             Method of communication with provider: none.    Addressed changes since last contact:  none  Discussed follow-up appointments. If no appointment was previously scheduled, appointment scheduling offered: Yes.   Is follow up appointment scheduled within 7 days of discharge? Yes.    Follow Up  Future Appointments   Date Time Provider Department Center   2024  8:00 AM Hattie Glaser, PT [facility]   2024  8:00 AM Hattie Glaser, PT [facility]   2024  8:00 AM Hattie Glaser, PT [facility]   2024  8:00 AM Hattie Glaser, PT [facility]   2024  9:00 AM Winston Freeman DO [id number] G[facility] AMB     External follow up appointment(s): na    LPN Care Coordinator reviewed discharge instructions, medical action plan, and red flags with patient and discussed any barriers to care and/or understanding of plan of care after discharge. Discussed appropriate site of care based on symptoms and resources available to patient including: PCP  Specialist  Urgent care clinics  When to call 911  [employer]. The patient agrees to contact the PCP office for questions related to their healthcare.     Advance Care Planning:   not on file; education provided.     Patients top risk factors for readmission: medical condition-Small Bowel Obstruction, GERD, Hypothyroidism, PAYAL, Prostate CA, CKD3, COVID-19  Interventions to address risk factors: Obtained and

## 2024-04-17 NOTE — PROGRESS NOTES
mobilizations; CR- Contract/Relax; SP- Sustained pressure; PIT- Positional inhibition techniques; STM Soft -tissue mobilization; MM- Myofascial mobilization; TrP-Trigger point release; IASTM- Instrument assisted soft tissue mobilizations, TDN-Trigger point dry needling)    Pt gives verbal consent to internal rectal assessment/treatment without chaperon present.      Treatment/Session Summary:    Treatment Assessment:   Pt reports good understanding of plan of care, as well as prescribed home exercise program. All questions were answered to pts satisfaction. Pt was invited to call with any further questions or concerns.   Communication/Consultation:  None today  Equipment provided today:  None  Recommendations/Intent for next treatment session: Next visit will focus on   Re-assess  Scar massage    Urge suppression if needed   MSK exam and strengthening/mobility.    >Total Treatment Billable Duration:  45 minutes   Time In: 0802  Time Out: 0850    Hattie Glaser PT         Charge Capture  Cross Current Portal  Appt Desk     Future Appointments   Date Time Provider Department Center   4/25/2024  8:00 AM Hattie Glaser PT [facility]   5/2/2024  8:00 AM Hattie Glaser, PT [facility]   5/9/2024  8:00 AM Hattie Glaser, PT [facility]   7/9/2024  9:00 AM Winston Freeman DO [id number] GVL AMB

## 2024-04-17 NOTE — THERAPY RECERTIFICATION
Bennie Garrett  : 1950  Primary: Vaccn [employer] (Other)  Secondary:  [facility] @ [address]  [facility] [address]  Phone: [phone]  Fax: [phone] Plan Frequency: 1x/week for 90 days    Plan of Care/Certification Expiration Date: 24        Plan of Care/Certification Expiration Date:  Plan of Care/Certification Expiration Date: 24    Frequency/Duration: Plan Frequency: 1x/week for 90 days      Time In/Out:   Time In: 0802  Time Out: 0850    PT Visit Info:    Plan Frequency: 1x/week for 90 days  Progress Note Due Date: 24      Visit Count:  2                OUTPATIENT PHYSICAL THERAPY:             Initial Assessment 2024                 Episode (PFPT)         Treatment Diagnosis:     Lack of coordination  Stress incontinence (female) (male)  Contributing Diagnosis:  Malignant neoplasm of prostate (C61)  Medical/Referring Diagnosis:    Malignant neoplasm of prostate (HCC)      Referring Physician:  Winston Freeman DO MD Orders:  PT Eval and Treat   Return MD Appt:    Date of Onset:      Allergies:  Patient has no known allergies.  Restrictions/Precautions:    None      Medications Last Reviewed:  2024     SUBJECTIVE   History of Injury/Illness (Reason for Referral):  Mr. Garrett is 74 yo male referred to pelvic floor physical therapy (PFPT) 2/2 prostate cancer by Winston Freeman DO. Findings were found in routine screening. He will be undergoing RALP on 3/28/24.      Patient reports that he is having some aching pain over the pelvic area. He reports that he has had pain over the perineal area for about a year and it is painful to sit on hard surfaces. He is typically using a cushion.    Patient has some knee pain bilaterally. Arthritis. He had multiple hernia repairs (3 in the groin and 2 in the belly button). Last repair was 6+ years ago   Exercise routine: mari crump, loves to walk      AT

## 2024-04-18 ENCOUNTER — HOSPITAL ENCOUNTER (OUTPATIENT)
Dept: PHYSICAL THERAPY | Age: 74
Setting detail: RECURRING SERIES
Discharge: HOME | End: 2024-04-21
Attending: UROLOGY
Payer: OTHER GOVERNMENT

## 2024-04-18 DIAGNOSIS — N39.3 STRESS INCONTINENCE (FEMALE) (MALE): ICD-10-CM

## 2024-04-18 DIAGNOSIS — R27.9 LACK OF COORDINATION: Primary | ICD-10-CM

## 2024-04-18 PROCEDURE — 97110 THERAPEUTIC EXERCISES: CPT

## 2024-04-18 PROCEDURE — 97530 THERAPEUTIC ACTIVITIES: CPT

## 2024-04-18 ASSESSMENT — PAIN SCALES - GENERAL: PAINLEVEL_OUTOF10: 0

## 2024-04-24 ENCOUNTER — CARE COORDINATION (OUTPATIENT)
Dept: CARE COORDINATION | Facility: CLINIC | Age: 74
End: 2024-04-24

## 2024-04-24 NOTE — CARE COORDINATION
Care Transitions Follow Up Call    Patient Current Location:  Home: [address] [place] [address]    LP Care Coordinator contacted the patient by telephone to follow up after admission on 2024.  Verified name and  with patient as identifiers.    Patient: Bennie Garrett  Patient : 1950   MRN: [id number]  Reason for Admission: radical prostatectomy  Discharge Date: 3/30/24 RARS: Readmission Risk Score: 5.5      Needs to be reviewed by the provider   Additional needs identified to be addressed with provider: No  none             Method of communication with provider: none.    Addressed changes since last contact:  none  Discussed follow-up appointments. If no appointment was previously scheduled, appointment scheduling offered: Yes.   Is follow up appointment scheduled within 7 days of discharge? No.    Follow Up  Future Appointments   Date Time Provider Department Center   2024  8:00 AM Hattie Glaser, PT [facility]   2024  8:00 AM Hattie Glaser, PT [facility]PT [facility]   2024  8:00 AM Hattie Glaser, PT [facility]PT [facility]   2024  9:00 AM Winston Freeman DO [id number] GVL AMB     External follow up appointment(s): na    LPN Care Coordinator reviewed discharge instructions, medical action plan, and red flags with patient and discussed any barriers to care and/or understanding of plan of care after discharge. Discussed appropriate site of care based on symptoms and resources available to patient including: PCP  Specialist  Urgent care clinics  When to call 911  [employer]. The patient agrees to contact the PCP office for questions related to their healthcare.     Advance Care Planning:   not on file.     Patients top risk factors for readmission: medical condition-SBO, GERD, Hypothyroidism, PAYAL, Prostate CA, CKD3, COVID-19  Interventions to address risk factors: Obtained and reviewed discharge summary and/or continuity of care documents, Communication with home health

## 2024-04-25 ENCOUNTER — HOSPITAL ENCOUNTER (OUTPATIENT)
Dept: PHYSICAL THERAPY | Age: 74
Setting detail: RECURRING SERIES
End: 2024-04-25
Attending: UROLOGY
Payer: OTHER GOVERNMENT

## 2024-04-25 NOTE — PROGRESS NOTES
Bennie Garrett  : 1950  Primary: Vaccn [employer]  Secondary:  [facility] @ [address] [place]  [facility] [address]  Phone: [phone]  Fax: [phone]    PT Visit Info:    Progress Note Due Date: 24     OT Visit Info:  No data recorded    OUTPATIENT THERAPY: 2024  Episode  Appt Desk        Bennie Garertt cancelled his appointment for today due to  dizzy and has a cold .  Will plan to follow up next during next appointment.  Thank you,  Hattie Glaser, PT    Future Appointments   Date Time Provider Department Center   2024  8:00 AM Hattie Glaser, PT [facility]   2024  8:00 AM Hattie Glaser, PT S[facility]ORPT [facility]   2024  8:00 AM aHttie Glaser, PT SFEORPT SFE   2024  9:00 AM Winston Freeman DO [id number] GVL AMB

## 2024-05-01 ENCOUNTER — CARE COORDINATION (OUTPATIENT)
Dept: CARE COORDINATION | Facility: CLINIC | Age: 74
End: 2024-05-01

## 2024-05-01 NOTE — CARE COORDINATION
Patient has graduated from the Care Transitions program on 05/01/2024.  Patient/family has the ability to self-manage at this time. Patient has no further care management needs, no referral to the ACM team for further management.     Patient has Care Transition Nurse's contact information for any further questions, concerns, or needs. Purnima Elena LPN [phone].  Patients upcoming visits:    Future Appointments   Date Time Provider Department Center   5/2/2024  8:00 AM Hattie Glaser, DELIA [facility]   5/9/2024  8:00 AM Hattie Glaser, PT NANO B[facility]RA   7/9/2024  9:00 AM Winston Freeman DO [id number] GVL AMB

## 2024-05-02 ENCOUNTER — HOSPITAL ENCOUNTER (OUTPATIENT)
Dept: PHYSICAL THERAPY | Age: 74
Setting detail: RECURRING SERIES
Discharge: HOME | End: 2024-05-05
Attending: UROLOGY
Payer: OTHER GOVERNMENT

## 2024-05-02 PROCEDURE — 97110 THERAPEUTIC EXERCISES: CPT

## 2024-05-02 PROCEDURE — 97530 THERAPEUTIC ACTIVITIES: CPT

## 2024-05-02 ASSESSMENT — PAIN SCALES - GENERAL: PAINLEVEL_OUTOF10: 0

## 2024-05-02 NOTE — PROGRESS NOTES
Bennie Garrett  : 1950  Primary: Vaccn [employer] (Other)  Secondary:  [facility] @ [address]  [facility] [address]  Phone: [phone]  Fax: [phone] Plan Frequency: 1x/week for 90 days  Plan of Care/Certification Expiration Date: 24        Plan of Care/Certification Expiration Date:  Plan of Care/Certification Expiration Date: 24    Frequency/Duration: Plan Frequency: 1x/week for 90 days      Time In/Out:   Time In: 0803  Time Out: 0858      PT Visit Info:    Progress Note Due Date: 24      Visit Count:  3    OUTPATIENT PHYSICAL THERAPY:   Treatment Note 2024       Episode  (PFPT)               Treatment Diagnosis:    No data found  Medical/Referring Diagnosis:    Malignant neoplasm of prostate (HCC)    Referring Physician:  Winston Freeman DO MD Orders:  PT Eval and Treat   Return MD Appt:     Date of Onset:  No data recorded   Allergies:   Patient has no known allergies.  Restrictions/Precautions:   None      Interventions Planned (Treatment may consist of any combination of the following):     See Assessment Note    Subjective Comments:   AT RE-CERT:     Patient reports he is having incontinence. He is leaking with most movements. He is having leaks with sit to stands. He reports the surgical incisions.   Denies UUI. He is starting to do better with being able to hold his urine occasionally and then get to the bathroom and urinate. He is staying busy - walks a lot.   He hasn't been doing his pelvic floor exercises. No pain in the pelvic area. He may have a tad bit of discomfort over the perineum but this has improved since his surgery   He is changing his depends pad when they are fairly wet.  Denies blood in urine, burning or pain with urination  6+ depends per day (PPD)      24:    When he first wakes up in the morning he makes it to the bathroom.   No leaking when sleeping at night typically.   He is trying to

## 2024-05-09 ENCOUNTER — HOSPITAL ENCOUNTER (OUTPATIENT)
Dept: PHYSICAL THERAPY | Age: 74
Setting detail: RECURRING SERIES
Discharge: HOME | End: 2024-05-12
Attending: UROLOGY
Payer: OTHER GOVERNMENT

## 2024-05-09 PROCEDURE — 97530 THERAPEUTIC ACTIVITIES: CPT

## 2024-05-09 PROCEDURE — 97110 THERAPEUTIC EXERCISES: CPT

## 2024-05-09 ASSESSMENT — PAIN SCALES - GENERAL: PAINLEVEL_OUTOF10: 0

## 2024-07-09 ENCOUNTER — OFFICE VISIT (OUTPATIENT)
Dept: UROLOGY | Age: 74
End: 2024-07-09
Payer: OTHER GOVERNMENT

## 2024-07-09 DIAGNOSIS — C61 PROSTATE CANCER (HCC): Primary | ICD-10-CM

## 2024-07-09 DIAGNOSIS — N39.3 STRESS INCONTINENCE, MALE: ICD-10-CM

## 2024-07-09 LAB
BILIRUBIN, URINE, POC: NEGATIVE
BLOOD URINE, POC: NEGATIVE
GLUCOSE URINE, POC: NEGATIVE
KETONES, URINE, POC: NEGATIVE
LEUKOCYTE ESTERASE, URINE, POC: NEGATIVE
NITRITE, URINE, POC: NEGATIVE
PH, URINE, POC: 5.5 (ref 4.6–8)
PROTEIN,URINE, POC: NEGATIVE
SPECIFIC GRAVITY, URINE, POC: 1.02 (ref 1–1.03)
URINALYSIS CLARITY, POC: NORMAL
URINALYSIS COLOR, POC: NORMAL
UROBILINOGEN, POC: NORMAL

## 2024-07-09 PROCEDURE — 81003 URINALYSIS AUTO W/O SCOPE: CPT | Performed by: UROLOGY

## 2024-07-09 PROCEDURE — 99214 OFFICE O/P EST MOD 30 MIN: CPT | Performed by: UROLOGY

## 2024-07-09 PROCEDURE — 1123F ACP DISCUSS/DSCN MKR DOCD: CPT | Performed by: UROLOGY

## 2024-07-09 NOTE — PROGRESS NOTES
[facility]  [address] [place] [address]  [phone]    Bennie Lockwood [place]  : 1950     HPI   73 y.o., male returns in follow up for CaP. S/P RALP on 3/28/24. Path showed [place] 3+4, T2Nx with a focal L posterior positive margin. He has done well post op.  Cont to report sig KARIN.  No recent PSA.  No improvement with Cialis rehab.      History reviewed. No pertinent past medical history.  Past Surgical History:   Procedure Laterality Date    PROSTATE BIOPSY N/A 2024    PROSTATE BIOPSY FUSION performed by Winston Freeman DO at  MAIN OR    PROSTATECTOMY N/A 3/28/2024    PROSTATECTOMY LAPAROSCOPIC ROBOTIC/POSSIBLE BILATERAL LYMPH NODE DISSECTION performed by Winston Freeman DO at  MAIN OR     Current Outpatient Medications   Medication Sig Dispense Refill    tadalafil (CIALIS) 20 MG tablet Take 1 tablet by mouth as needed for Erectile Dysfunction 20 tablet 6    sennosides-docusate sodium (SENOKOT-S) 8.6-50 MG tablet Take 1 tablet by mouth daily 30 tablet 0     No current facility-administered medications for this visit.     No Known Allergies  Social History     Socioeconomic History    Marital status:      Spouse name: Not on file    Number of children: Not on file    Years of education: Not on file    Highest education level: Not on file   Occupational History    Not on file   Tobacco Use    Smoking status: Never    Smokeless tobacco: Never   Vaping Use    Vaping Use: Never used   Substance and Sexual Activity    Alcohol use: Never    Drug use: Never    Sexual activity: Not on file   Other Topics Concern    Not on file   Social History Narrative    Not on file     Social Determinants of Health     Financial Resource Strain: Not on file   Food Insecurity: No Food Insecurity (3/28/2024)    Hunger Vital Sign     Worried About Running Out of Food in the Last Year: Never true     Ran Out of Food in the Last Year: Never true   Transportation Needs: No

## 2024-07-10 LAB — PSA SERPL DL<=0.01 NG/ML-MCNC: <0.006 NG/ML (ref 0–4)

## 2024-07-12 ENCOUNTER — TELEPHONE (OUTPATIENT)
Dept: UROLOGY | Age: 74
End: 2024-07-12

## 2024-07-12 NOTE — TELEPHONE ENCOUNTER
----- Message from Winston Freeman DO sent at 7/11/2024  7:20 AM EDT -----  Please let pt know that PSA is undetectable. Great news!

## 2024-10-09 ENCOUNTER — LAB (OUTPATIENT)
Dept: UROLOGY | Age: 74
End: 2024-10-09

## 2024-10-09 DIAGNOSIS — C61 PROSTATE CANCER (HCC): ICD-10-CM

## 2024-10-10 LAB — PSA SERPL DL<=0.01 NG/ML-MCNC: <0.006 NG/ML (ref 0–4)

## 2024-10-16 ENCOUNTER — OFFICE VISIT (OUTPATIENT)
Dept: UROLOGY | Age: 74
End: 2024-10-16

## 2024-10-16 DIAGNOSIS — N39.3 STRESS INCONTINENCE: ICD-10-CM

## 2024-10-16 DIAGNOSIS — N52.9 ERECTILE DYSFUNCTION, UNSPECIFIED ERECTILE DYSFUNCTION TYPE: ICD-10-CM

## 2024-10-16 DIAGNOSIS — C61 PROSTATE CANCER (HCC): Primary | ICD-10-CM

## 2024-10-16 LAB
BILIRUBIN, URINE, POC: NEGATIVE
BLOOD URINE, POC: NEGATIVE
GLUCOSE URINE, POC: NEGATIVE MG/DL
KETONES, URINE, POC: NEGATIVE MG/DL
LEUKOCYTE ESTERASE, URINE, POC: NEGATIVE
NITRITE, URINE, POC: NEGATIVE
PH, URINE, POC: 5.5 (ref 4.6–8)
PROTEIN,URINE, POC: NEGATIVE MG/DL
SPECIFIC GRAVITY, URINE, POC: 1.02 (ref 1–1.03)
URINALYSIS CLARITY, POC: NORMAL
URINALYSIS COLOR, POC: NORMAL
UROBILINOGEN, POC: NORMAL MG/DL

## 2024-10-16 NOTE — PROGRESS NOTES
[facility]  [address] [place] [address]  [phone]    Bennie Lockwood [place]  : 1950     HPI   74 y.o., male returns in follow up for CaP. S/P RALP on 3/28/24. Path showed [place] 3+4, T2Nx with a focal L posterior positive margin. He has done well post op.  Cont to report sig KARIN (up to 10 ppd).  PSA remains und 10/9/24.  No improvement with [facility] rehab.       History reviewed. No pertinent past medical history.  Past Surgical History:   Procedure Laterality Date    PROSTATE BIOPSY N/A 2024    PROSTATE BIOPSY FUSION performed by Winston Freeman DO at [facility] MAIN OR    PROSTATECTOMY N/A 3/28/2024    PROSTATECTOMY LAPAROSCOPIC ROBOTIC/POSSIBLE BILATERAL LYMPH NODE DISSECTION performed by Winston Freeman DO at [facility] MAIN OR     Current Outpatient Medications   Medication Sig Dispense Refill    tadalafil (CIALIS) 20 MG tablet Take 1 tablet by mouth as needed for Erectile Dysfunction 20 tablet 6    sennosides-docusate sodium (SENOKOT-S) 8.6-50 MG tablet Take 1 tablet by mouth daily 30 tablet 0     No current facility-administered medications for this visit.     No Known Allergies  Social History     Socioeconomic History    Marital status:      Spouse name: Not on file    Number of children: Not on file    Years of education: Not on file    Highest education level: Not on file   Occupational History    Not on file   Tobacco Use    Smoking status: Never    Smokeless tobacco: Never   Vaping Use    Vaping status: Never Used   Substance and Sexual Activity    Alcohol use: Never    Drug use: Never    Sexual activity: Not on file   Other Topics Concern    Not on file   Social History Narrative    Not on file     Social Determinants of Health     Financial Resource Strain: Not on file   Food Insecurity: No Food Insecurity (3/28/2024)    Hunger Vital Sign     Worried About Running Out of Food in the Last Year: Never true     Ran Out of Food in the Last Year: Never true

## 2024-11-04 ENCOUNTER — HOSPITAL ENCOUNTER (OUTPATIENT)
Dept: PHYSICAL THERAPY | Age: 74
Setting detail: RECURRING SERIES
Discharge: HOME | End: 2024-11-07
Attending: UROLOGY
Payer: OTHER GOVERNMENT

## 2024-11-04 DIAGNOSIS — M62.81 MUSCLE WEAKNESS (GENERALIZED): ICD-10-CM

## 2024-11-04 DIAGNOSIS — R29.3 ABNORMAL POSTURE: ICD-10-CM

## 2024-11-04 DIAGNOSIS — N39.3 URINARY INCONTINENCE, MALE, STRESS: Primary | ICD-10-CM

## 2024-11-04 PROCEDURE — 97161 PT EVAL LOW COMPLEX 20 MIN: CPT

## 2024-11-04 PROCEDURE — 97530 THERAPEUTIC ACTIVITIES: CPT

## 2024-11-04 PROCEDURE — 97112 NEUROMUSCULAR REEDUCATION: CPT

## 2024-11-04 NOTE — PROGRESS NOTES
Bennie Garrett  : 1950  Primary: Vaccn [employer] (Other)  Secondary:  [facility] [place] [address] [place] [address]  Phone: [phone]  Fax: [phone] Plan Frequency: 1x/week until end POC  Plan of Care/Certification Expiration Date: 25            Plan of Care/Certification Expiration Date:  Plan of Care/Certification Expiration Date: 25    Frequency/Duration:   Plan Frequency: 1x/week until end POC      Time In/Out:   Time In: 801  Time Out: 905      PT Visit Info:    Plan Frequency: 1x/week until end POC  Total # of Visits Approved: 15 (exp 25)  Total # of Visits to Date: 1  Progress Note Counter: 1      Visit Count:  1    OUTPATIENT PHYSICAL THERAPY:   Treatment Note 2024       Charge Capture        Episode  (Post Prostatectomy UI/ED)               Treatment Diagnosis:    Urinary incontinence, male, stress  Muscle weakness (generalized)  Abnormal posture    Medical/Referring Diagnosis:   Stress incontinence   Referring Physician: Winston Freeman DO MD Orders: PT Eval and Treat   Return MD Appt:  25 with Dr. Freeman   Date of Onset: Onset Date: 24    Allergies: Patient has no known allergies.    Restrictions/Precautions:  Per MD note 10/16 - S/P RALP on 3/28/24. Path showed [place] 3+4, T2Nx with a focal L posterior positive margin      Interventions Planned (Treatment may consist of any combination of the following): See Assessment Note      Subjective Comments: See Evaluation Note from today    Pt gives verbal consent to internal rectal assessment/treatment without chaperon present.    Initial Pain Level:      0/10   Post Session Pain Level:        0/10     Medications Last Reviewed: 2024    Updated Objective Findings: See Evaluation Note from today    RANGE OF MOTION Date:    To be assessed next visit Date:      RIGHT LEFT RIGHT LEFT   Lumbar Extension     Lumbar Flexion     Lumbar Rotation

## 2024-11-04 NOTE — THERAPY EVALUATION
Mechanics, and Decreased Activity Tolerance/Endurance*      Therapy Prognosis: Good       Initial Assessment Complexity: Low Complexity       PLAN   Effective Dates: 11/4/24 TO Plan of Care/Certification Expiration Date: 02/02/25      Frequency/Duration: Plan Frequency: 1x/week until end POC      Interventions Planned: (Treatment may consist of any combination of the following):  Endurance Training, Home Exercise Program (HEP), Manual Therapy, Neuromuscular Re-education/Strengthening, Modalities: and   E-stim - manual, Therapeutic Activites, and Therapeutic Exercise/Strengthening      Goals  Short-Term Functional Goals: Time Frame: 6 weeks  Bennie Garrett will complete bladder/voiding diary and bring with to follow up visit for review.  Bennie Garrett will demonstrate good understanding of “The Knack” to reduce future occurrence of stress urinary incontinence during episodes of physical activity, coughing, sneezing, or laughing.  Bennie Garrett will demonstrate good understanding of the “Urge Suppression Technique” to reduce future occurrence of urge urinary incontinence for improvement in quality of life as sx of UUI reduce.  Bennie Garrett will be able to delay voiding after urge for 5 minutes to reach the bathroom without leakage occurring.    Discharge Goals: Time Frame: 12 weeks  Bennie Garrett will be independent with home exercise program for pelvic floor muscles exercises for long term management of symptoms of urinary and sexual dysfunction.  Bennie Garrett will improve increase time between voids to voiding time of every 2-3 hours during the day for improve quality of life.  Bennie Garrett will report use of < 2 briefs per day, demonstrating improvement in overall symptoms of urinary leakage.  Bennie Garrett will improve manual muscle testing of bilateral lower extremities to >4/5 to improve lumbo pelvic stability.  Bennie Garrett score < 11/21

## 2024-11-13 ENCOUNTER — HOSPITAL ENCOUNTER (OUTPATIENT)
Dept: PHYSICAL THERAPY | Age: 74
Setting detail: RECURRING SERIES
Discharge: HOME | End: 2024-11-16
Attending: UROLOGY
Payer: OTHER GOVERNMENT

## 2024-11-13 PROCEDURE — 97530 THERAPEUTIC ACTIVITIES: CPT

## 2024-11-13 PROCEDURE — 97112 NEUROMUSCULAR REEDUCATION: CPT

## 2024-11-13 NOTE — PROGRESS NOTES
Type Location Comments   11/4/24 Internal assessment/treatment Via rectal canal                (Used abbreviations: MET - muscle energy technique; SCS- Strain counter strain; CTM-Connective tissue mobilizations; CR- Contract/Relax; SP- Sustained pressure; PIT- Positional inhibition techniques; STM Soft -tissue mobilization; MM- Myofascial mobilization; TrP-Trigger point release; IASTM- Instrument assisted soft tissue mobilizations, TDN-Trigger point dry needling)      Treatment/Session Summary:    Treatment Assessment: Started visit with assessment of lumbar and hip mobility with some limitations at B hip flexors, B piriformis and right hamstrings. Reviewed PFM contractions and continued to work on mms coordination with contraction and lengthening, does well with cues and quickly adjusts movements - greatest difficulty today with PFM lengthening and intermittent compensation by PFM contraction during this. Cont to educate on bladder retraining with penile pump and reviewed his first bladder diary.  Communication/Consultation: None today  Equipment provided: HEP  Recommendations/Intent for next treatment session: Next visit will cont to progress his coordination and strengthening with PFM exercises. Will plan to review bladder diary and progress with penile clamp.    >Total Treatment Billable Duration: 55 minutes  Time In: 0800  Time Out: 0900    Susan Shook PT, DPT    Charge Capture  [employer] Portal  Appt Desk  Attendance Report      Future Appointments   Date Time Provider Department Center   11/20/2024  8:00 AM Susan Shook PT [id number]   11/25/2024 11:00 AM Susan Shook PT [facility]   12/4/2024  8:00 AM Susan Shook PT [facility]   12/11/2024  8:00 AM Susan Shook PT [facility]   12/18/2024  8:00 AM Susan Shook PT [facility]   1/14/2025  8:15 AM [id number] BLOOD DRAW [id number] [facility] AMB   1/21/2025  8:30 AM Winston Freeman DO [id number] [facility] AMB

## 2024-11-20 ENCOUNTER — HOSPITAL ENCOUNTER (OUTPATIENT)
Dept: PHYSICAL THERAPY | Age: 74
Setting detail: RECURRING SERIES
End: 2024-11-20
Attending: UROLOGY
Payer: OTHER GOVERNMENT

## 2024-11-20 NOTE — PROGRESS NOTES
Bennie Garrett  : 1950  Primary: Vaccn [employer]  Secondary:  [facility] [place] [address] [place]  [facility] [address]  Phone: [phone]  Fax: [phone] Plan Frequency: 1x/week until end POC    Plan of Care/Certification Expiration Date: 25          PT Visit Info:  Total # of Visits Approved: 15 (exp 25)  Total # of Visits to Date: 2  Progress Note Counter: 2  Progress Note Due Date: 24  Canceled Appointment: 1       OUTPATIENT PHYSICAL THERAPY 2024     Appt Desk   Episode   MyChart      Totals:  Patient has cancelled 1 and late cancelled/no-showed 0 of 3 scheduled visits. He cancelled today's appt due to not feeling well. Therapist reached out to pt to request that he email bladder diary at some point today for therapist to review.    Susan Shook, PT, DPT    Future Appointments   Date Time Provider Department Center   2024 11:00 AM Susan Shook, PT [facility]   2024  8:00 AM Susan Shook, PT [facility]   2024  8:00 AM Susan Shook, PT [facility]   2024  8:00 AM Susan Shook, PT [facility]   2025  8:15 AM [id number] BLOOD DRAW [id number] [facility] AMB   2025  8:30 AM Winston Freeman DO [id number] [facility] AMB

## 2024-11-25 ENCOUNTER — HOSPITAL ENCOUNTER (OUTPATIENT)
Dept: PHYSICAL THERAPY | Age: 74
Setting detail: RECURRING SERIES
Discharge: HOME | End: 2024-11-28
Attending: UROLOGY
Payer: OTHER GOVERNMENT

## 2024-11-25 PROCEDURE — 97110 THERAPEUTIC EXERCISES: CPT

## 2024-11-25 PROCEDURE — 97112 NEUROMUSCULAR REEDUCATION: CPT

## 2024-11-25 PROCEDURE — 97530 THERAPEUTIC ACTIVITIES: CPT

## 2024-11-25 NOTE — PROGRESS NOTES
Location Comments   11/4/24 Internal assessment/treatment Via rectal canal                (Used abbreviations: MET - muscle energy technique; SCS- Strain counter strain; CTM-Connective tissue mobilizations; CR- Contract/Relax; SP- Sustained pressure; PIT- Positional inhibition techniques; STM Soft -tissue mobilization; MM- Myofascial mobilization; TrP-Trigger point release; IASTM- Instrument assisted soft tissue mobilizations, TDN-Trigger point dry needling)      Treatment/Session Summary:    Treatment Assessment: Notable gains in PFM endurance with progression of long holds. Educated on the knack and urge suppression techniques to improve his control of urine. No change with bladder diary from this week versus last, cont to discuss with pt that this may take a few weeks before notable changes occur. Intermittently firing TrA without cues, provided with HEP update to cont to target this over the next week.  Communication/Consultation: None today  Equipment provided: HEP  Recommendations/Intent for next treatment session: Next visit will cont to progress his coordination and strengthening with PFM exercises. Will plan to review bladder diary and progress with penile clamp.    >Total Treatment Billable Duration: 56 minutes  Time In: 1100  Time Out: 1159    Susan Shook PT, DPT    Charge Capture  [facility] Portal  Appt Desk  Attendance Report      Future Appointments   Date Time Provider Department Center   12/4/2024  8:00 AM Susan Shook PT [facility]   12/11/2024  8:00 AM Susan Shook PT [facility]   12/18/2024  8:00 AM Susan Shook PT [facility]   1/14/2025  8:15 AM [id number] BLOOD DRAW [id number] [facility] AMB   1/21/2025  8:30 AM Winsotn Freeman DO [id number] [facility] AMB

## 2024-12-04 ENCOUNTER — APPOINTMENT (OUTPATIENT)
Dept: PHYSICAL THERAPY | Age: 74
End: 2024-12-04
Attending: UROLOGY
Payer: OTHER GOVERNMENT

## 2024-12-18 ENCOUNTER — HOSPITAL ENCOUNTER (OUTPATIENT)
Dept: PHYSICAL THERAPY | Age: 74
Setting detail: RECURRING SERIES
Discharge: HOME | End: 2024-12-21
Attending: UROLOGY
Payer: OTHER GOVERNMENT

## 2024-12-18 PROCEDURE — 97110 THERAPEUTIC EXERCISES: CPT

## 2024-12-18 PROCEDURE — 97530 THERAPEUTIC ACTIVITIES: CPT

## 2024-12-18 NOTE — PROGRESS NOTES
Health Maintenance Due   Topic Date Due   • Shingles Vaccine (1 of 2) 10/03/1981   • Medicare Wellness 65+  10/03/1996       Patient is due for topics as listed above but is not proceeding with Immunization(s) Shingles at this time. Pt refuses shingles.      Over the last 2 weeks, how often have you been bothered by the following problems?          PHQ2 Score:  0  1. Little interest or pleasure in doing things?:  0  2. Feeling down, depressed, or hopeless?:  0           Patient Care Team:  PEDRO Dalal as PCP - General (Nurse Practitioner)       Sleep Hygiene     Bowel/Bladder Log Provided with bladder diary   Reviwed diary: not expelling urine completely (sitting on toilet), mostly leaking when at rest or with activity (walking, STS, planting in garden), not always having strong urge to toilet but leaking on the way there.   Reviewed diary: no notable changes from this past week 24   Timed Voiding: Re-Training the Bladder Began with bladder re-training utilizing penile clamp with instruction for 6d wearing, 1d off and pt completing new bladder diary on 7th day    He has continued to utilize the clamp daily but does not have it on today. He does see some change and control but is still. He is not waking up wet at night, but he does wake 1x/night around 2-3am to urinate. He feels that there isn't a lot of an urge but he is awake and feels that he should just go to the bathroom.  24   Bowel Health Education     Constipation Care     Diarrhea/Fecal Leakage     Colonic Massage     Toilet Positioning     Squatty Potty     Defecation Dynamics     Sources Of Fiber     Constipation Recipe     Perineal Massage     Sexual Positioning     Penile Clams Educated pt on protocol / purpose, with handout provided. Discussed pt clearing this with his MD first.     He did obtained a penile clamp last week (Conchita), he wore it 2-3 days off and on but has not worn since Sat or Sun. It did help with his control when walking.  24   Penis Pumps Educated pt on protocol / purpose, with handout provided.  24    Scar Massage     Body Mechanics     Posture/Ergonomics     Diaphragmatic Breathing     Pain Science Education     Resources and Technology     Other Patient Education         Treatment/Session Summary:    Treatment Assessment: although he has been recovering the last few weeks from the bone graft in his jaw, he has continued to demonstrate gains in his PFM strength, endurance, and motor control.

## 2025-01-09 ENCOUNTER — APPOINTMENT (OUTPATIENT)
Dept: PHYSICAL THERAPY | Age: 75
End: 2025-01-09
Attending: UROLOGY
Payer: OTHER GOVERNMENT

## 2025-01-14 ENCOUNTER — LAB (OUTPATIENT)
Dept: UROLOGY | Age: 75
End: 2025-01-14

## 2025-01-14 DIAGNOSIS — C61 PROSTATE CANCER (HCC): ICD-10-CM

## 2025-01-15 LAB — PSA SERPL DL<=0.01 NG/ML-MCNC: <0.006 NG/ML (ref 0–4)

## 2025-01-16 ENCOUNTER — HOSPITAL ENCOUNTER (OUTPATIENT)
Dept: PHYSICAL THERAPY | Age: 75
Setting detail: RECURRING SERIES
Discharge: HOME | End: 2025-01-19
Attending: UROLOGY
Payer: OTHER GOVERNMENT

## 2025-01-16 PROCEDURE — 97530 THERAPEUTIC ACTIVITIES: CPT

## 2025-01-16 PROCEDURE — 97110 THERAPEUTIC EXERCISES: CPT

## 2025-01-16 NOTE — PROGRESS NOTES
Bennie Garrett  : 1950  Primary: Vaccn [employer] (Other)  Secondary:  [facility] [place] [address] [place] [address]  Phone: [phone]  Fax: [phone] Plan Frequency: 1x/week until end POC  Plan of Care/Certification Expiration Date: 25            Plan of Care/Certification Expiration Date:  Plan of Care/Certification Expiration Date: 25    Frequency/Duration:   Plan Frequency: 1x/week until end POC      Time In/Out:   Time In: 0800  Time Out: 0857      PT Visit Info:    Plan Frequency: 1x/week until end POC  Total # of Visits Approved: 15 (exp 25)  Total # of Visits to Date: 5  Progress Note Counter: 1  No Show: 2      Visit Count:  5    OUTPATIENT PHYSICAL THERAPY:   Treatment Note 2025       Charge Capture        Episode  (Post Prostatectomy UI/ED)               Treatment Diagnosis:    Urinary incontinence, male, stress  Muscle weakness (generalized)  Abnormal posture    Medical/Referring Diagnosis:   Stress incontinence   Referring Physician: Winston Freeman DO MD Orders: PT Eval and Treat   Return MD Appt:  25 with Dr. Freeman   Date of Onset: Onset Date: 24    Allergies: Patient has no known allergies.    Restrictions/Precautions:  Per MD note 10/16 - S/P RALP on 3/28/24. Path showed [place] 3+4, T2Nx with a focal L posterior positive margin      Interventions Planned (Treatment may consist of any combination of the following): See Assessment Note      Subjective Comments: See Recertification Note from today    Pt gives verbal consent to internal rectal assessment/treatment without chaperon present.    Initial Pain Level:      0/10   Post Session Pain Level:        0/10     Medications Last Reviewed: 2025    Updated Objective Findings: See Recertification Note from today    Treatment     NEUROMUSCULAR RE-EDUCATION: (0 minutes): Exercise/activities per grid below to improve balance, coordination,

## 2025-01-16 NOTE — THERAPY RECERTIFICATION
Bennie Garrett  : 1950  Primary: Vaccn [employer] (Other)  Secondary:  [facility] @ [place] [address] [address]  Phone: [phone]  Fax: [phone] Plan Frequency: Hold PT until after his procedures, when he is better able to partake in therapy, and then plan for return 1x/week until end POC    Plan of Care/Certification Expiration Date: 25              Plan of Care/Certification Expiration Date:  Plan of Care/Certification Expiration Date: 25    Frequency/Duration:   Plan Frequency: Hold PT until after his procedures, when he is better able to partake in therapy, and then plan for return 1x/week until end POC      Time In/Out:   Time In: 0800  Time Out: 0857      PT Visit Info:    Plan Frequency: Hold PT until after his procedures, when he is better able to partake in therapy, and then plan for return 1x/week until end POC  Total # of Visits Approved: 15 (exp 25)  Total # of Visits to Date: 5  Progress Note Counter: 1  No Show: 2      Visit Count:  5                OUTPATIENT PHYSICAL THERAPY:             Recertification 2025                 Episode (Post Prostatectomy UI/ED)         Treatment Diagnosis:     Urinary incontinence, male, stress  Muscle weakness (generalized)  Abnormal posture  Medical/Referring Diagnosis:    Stress incontinence    Referring Physician:  Winston Freeman DO MD Orders:  PT Eval and Treat   Return MD Appt:  25 with Dr. Freeman  Date of Onset:  Onset Date: 24     Allergies:  Patient has no known allergies.    Restrictions/Precautions:   Per MD note 10/16 - S/P RALP on 3/28/24. Path showed [place] 3+4, T2Nx with a focal L posterior positive margin        Medications Last Reviewed:  2025       SUBJECTIVE     History of Injury/Illness (Reason for Referral):  24: Mr. Garrett is a 74 y.o. male referred to pelvic floor physical therapy (PFPT) by Winston Freeman DO

## 2025-01-21 ENCOUNTER — OFFICE VISIT (OUTPATIENT)
Dept: UROLOGY | Age: 75
End: 2025-01-21

## 2025-01-21 DIAGNOSIS — N52.9 ERECTILE DYSFUNCTION, UNSPECIFIED ERECTILE DYSFUNCTION TYPE: ICD-10-CM

## 2025-01-21 DIAGNOSIS — C61 PROSTATE CANCER (HCC): Primary | ICD-10-CM

## 2025-01-21 DIAGNOSIS — N39.3 STRESS INCONTINENCE: ICD-10-CM

## 2025-01-21 NOTE — PROGRESS NOTES
[facility]  [address] [place] [address]  [phone]    Bennie Lockwood [place]  : 1950     HPI   74 y.o., male returns in follow up for CaP. S/P RALP on 3/28/24. Path showed [place] 3+4, T2Nx with a focal L posterior positive margin. He has done well post op.  Cont to report sig KARIN (mult ppd) despite repeat PT sessions.  PSA remains und 25  No improvement with [facility] rehab.       No past medical history on file.  Past Surgical History:   Procedure Laterality Date    PROSTATE BIOPSY N/A 2024    PROSTATE BIOPSY FUSION performed by Winston Freeman DO at [facility] MAIN OR    PROSTATECTOMY N/A 3/28/2024    PROSTATECTOMY LAPAROSCOPIC ROBOTIC/POSSIBLE BILATERAL LYMPH NODE DISSECTION performed by Winston Freeman DO at [facility] MAIN OR     Current Outpatient Medications   Medication Sig Dispense Refill    tadalafil (CIALIS) 20 MG tablet Take 1 tablet by mouth as needed for Erectile Dysfunction 20 tablet 6    sennosides-docusate sodium (SENOKOT-S) 8.6-50 MG tablet Take 1 tablet by mouth daily 30 tablet 0     No current facility-administered medications for this visit.     No Known Allergies  Social History     Socioeconomic History    Marital status:      Spouse name: Not on file    Number of children: Not on file    Years of education: Not on file    Highest education level: Not on file   Occupational History    Not on file   Tobacco Use    Smoking status: Never    Smokeless tobacco: Never   Vaping Use    Vaping status: Never Used   Substance and Sexual Activity    Alcohol use: Never    Drug use: Never    Sexual activity: Not on file   Other Topics Concern    Not on file   Social History Narrative    Not on file     Social Determinants of Health     Financial Resource Strain: Not on file   Food Insecurity: No Food Insecurity (3/28/2024)    Hunger Vital Sign     Worried About Running Out of Food in the Last Year: Never true     Ran Out of Food in the Last Year: Never true

## 2025-01-23 ENCOUNTER — APPOINTMENT (OUTPATIENT)
Dept: PHYSICAL THERAPY | Age: 75
End: 2025-01-23
Attending: UROLOGY
Payer: OTHER GOVERNMENT

## 2025-03-25 ENCOUNTER — HOSPITAL ENCOUNTER (OUTPATIENT)
Dept: MRI IMAGING | Age: 75
Discharge: HOME | End: 2025-03-27
Payer: OTHER GOVERNMENT

## 2025-03-25 DIAGNOSIS — R20.2 PARESTHESIA: ICD-10-CM

## 2025-03-25 PROCEDURE — 72148 MRI LUMBAR SPINE W/O DYE: CPT

## 2025-03-25 PROCEDURE — 72141 MRI NECK SPINE W/O DYE: CPT

## 2025-04-25 ENCOUNTER — OFFICE VISIT (OUTPATIENT)
Dept: UROLOGY | Age: 75
End: 2025-04-25

## 2025-04-25 DIAGNOSIS — C61 PROSTATE CANCER (HCC): Primary | ICD-10-CM

## 2025-04-25 DIAGNOSIS — N39.3 STRESS INCONTINENCE, MALE: ICD-10-CM

## 2025-04-25 DIAGNOSIS — N39.3 STRESS INCONTINENCE: ICD-10-CM

## 2025-04-25 NOTE — PROGRESS NOTES
[facility]  [address] [place] [id number]  [phone]    Bennie Garrett  : 1950    Chief Complaint   Patient presents with    Follow-up        HPI     Bennie Garrett is a 74 y.o. male returns in follow up for CaP. S/P RALP on 3/28/24. Path showed [place] 3+4, T2Nx with a focal L posterior positive margin. He has done well post op.  Cont to report sig KARIN (mult ppd) despite repeat PT sessions.  PSA remains und 25  No improvement with [facility] rehab.   Here to discuss surgical tx of his stress incontinence.  He reports \"no control\". He uses 10 or more Depends/day. The only time he voids is when he is recumbent .  He sees pain management at [place]. States that he has pain all over his body due to Agent Orange exposure.   No past medical history on file.  Past Surgical History:   Procedure Laterality Date    PROSTATE BIOPSY N/A 2024    PROSTATE BIOPSY FUSION performed by Winston Freeman DO at [facility] MAIN OR    PROSTATECTOMY N/A 3/28/2024    PROSTATECTOMY LAPAROSCOPIC ROBOTIC/POSSIBLE BILATERAL LYMPH NODE DISSECTION performed by Winston Freeman DO at [facility] MAIN OR     Current Outpatient Medications   Medication Sig Dispense Refill    tadalafil (CIALIS) 20 MG tablet Take 1 tablet by mouth as needed for Erectile Dysfunction 20 tablet 6    sennosides-docusate sodium (SENOKOT-S) 8.6-50 MG tablet Take 1 tablet by mouth daily 30 tablet 0     No current facility-administered medications for this visit.     No Known Allergies  Social History     Socioeconomic History    Marital status:      Spouse name: Not on file    Number of children: Not on file    Years of education: Not on file    Highest education level: Not on file   Occupational History    Not on file   Tobacco Use    Smoking status: Never    Smokeless tobacco: Never   Vaping Use    Vaping status: Never Used   Substance and Sexual Activity    Alcohol use: Never    Drug use: Never

## 2025-05-09 ENCOUNTER — PROCEDURE VISIT (OUTPATIENT)
Dept: UROLOGY | Age: 75
End: 2025-05-09

## 2025-05-09 DIAGNOSIS — C61 PROSTATE CANCER (HCC): ICD-10-CM

## 2025-05-09 DIAGNOSIS — N39.3 STRESS INCONTINENCE, MALE: Primary | ICD-10-CM

## 2025-05-09 LAB
BILIRUBIN, URINE, POC: NEGATIVE
BLOOD URINE, POC: NEGATIVE
GLUCOSE URINE, POC: NEGATIVE MG/DL
KETONES, URINE, POC: NEGATIVE MG/DL
LEUKOCYTE ESTERASE, URINE, POC: NEGATIVE
NITRITE, URINE, POC: NEGATIVE
PH, URINE, POC: 6 (ref 4.6–8)
PROTEIN,URINE, POC: NEGATIVE MG/DL
PVR, POC: 27 CC
SPECIFIC GRAVITY, URINE, POC: 1.01 (ref 1–1.03)
URINALYSIS CLARITY, POC: NORMAL
URINALYSIS COLOR, POC: NORMAL
UROBILINOGEN, POC: NORMAL MG/DL

## 2025-05-09 RX ORDER — TROSPIUM CHLORIDE 20 MG/1
20 TABLET, FILM COATED ORAL 2 TIMES DAILY
Qty: 60 TABLET | Refills: 11 | Status: SHIPPED | OUTPATIENT
Start: 2025-05-09

## 2025-05-09 NOTE — PROGRESS NOTES
[facility]  [address] [facility]   [address]  [place] [address]  [phone]    Bennie Lockwood [place]  : 1950         HPI   74 y.o., male   returns in follow up for CaP. S/P RALP on 3/28/24. Path showed [place] 3+4, T2Nx with a focal L posterior positive margin. He has done well post op.  Cont to report sig KARIN (mult ppd) despite repeat PT sessions.  PSA remains und 25  No improvement with [facility] rehab.   Here to discuss surgical tx of his stress incontinence.  He reports \"no control\". He uses 10 or more Depends/day. The only time he voids is when he is recumbent .  He sees pain management at [place]. States that he has pain all over his body due to Agent Orange exposure.    He has severe KARIN, 10 pads/day. Discussed male sling and AUS. Gave brochure about these. May do better with AUS.   He denies urgency, all urine drains out when he is walking around.   Here for PVR/cysto.presents for cystoscopy       No past medical history on file.  Past Surgical History:   Procedure Laterality Date    PROSTATE BIOPSY N/A 2024    PROSTATE BIOPSY FUSION performed by Winston Freeman DO at [facility] MAIN OR    PROSTATECTOMY N/A 3/28/2024    PROSTATECTOMY LAPAROSCOPIC ROBOTIC/POSSIBLE BILATERAL LYMPH NODE DISSECTION performed by Winston Freeman DO at [facility] MAIN OR     Current Outpatient Medications   Medication Sig Dispense Refill    trospium (SANCTURA) 20 MG tablet Take 1 tablet by mouth 2 times daily 60 tablet 11    tadalafil (CIALIS) 20 MG tablet Take 1 tablet by mouth as needed for Erectile Dysfunction 20 tablet 6    sennosides-docusate sodium (SENOKOT-S) 8.6-50 MG tablet Take 1 tablet by mouth daily 30 tablet 0     No current facility-administered medications for this visit.     No Known Allergies  Social History     Socioeconomic History    Marital status:      Spouse name: Not on file    Number of children: Not on file    Years of education: Not on file    Highest education

## 2025-07-09 ENCOUNTER — LAB (OUTPATIENT)
Dept: UROLOGY | Age: 75
End: 2025-07-09

## 2025-07-09 DIAGNOSIS — C61 PROSTATE CANCER (HCC): ICD-10-CM

## 2025-07-10 LAB — PSA SERPL DL<=0.01 NG/ML-MCNC: <0.006 NG/ML (ref 0–4)

## 2025-07-16 ENCOUNTER — OFFICE VISIT (OUTPATIENT)
Dept: UROLOGY | Age: 75
End: 2025-07-16
Payer: OTHER GOVERNMENT

## 2025-07-16 DIAGNOSIS — N52.9 ERECTILE DYSFUNCTION, UNSPECIFIED ERECTILE DYSFUNCTION TYPE: ICD-10-CM

## 2025-07-16 DIAGNOSIS — N39.3 STRESS INCONTINENCE, MALE: ICD-10-CM

## 2025-07-16 DIAGNOSIS — C61 PROSTATE CANCER (HCC): Primary | ICD-10-CM

## 2025-07-16 LAB
BILIRUBIN, URINE, POC: NEGATIVE
BLOOD URINE, POC: NEGATIVE
GLUCOSE URINE, POC: NEGATIVE MG/DL
KETONES, URINE, POC: NEGATIVE MG/DL
LEUKOCYTE ESTERASE, URINE, POC: NEGATIVE
NITRITE, URINE, POC: NEGATIVE
PH, URINE, POC: 6 (ref 4.6–8)
PROTEIN,URINE, POC: 30 MG/DL
SPECIFIC GRAVITY, URINE, POC: 1.02 (ref 1–1.03)
URINALYSIS CLARITY, POC: NORMAL
URINALYSIS COLOR, POC: NORMAL
UROBILINOGEN, POC: NORMAL MG/DL

## 2025-07-16 PROCEDURE — 99213 OFFICE O/P EST LOW 20 MIN: CPT | Performed by: UROLOGY

## 2025-07-16 PROCEDURE — 81003 URINALYSIS AUTO W/O SCOPE: CPT | Performed by: UROLOGY

## 2025-07-16 PROCEDURE — 1123F ACP DISCUSS/DSCN MKR DOCD: CPT | Performed by: UROLOGY

## 2025-07-16 NOTE — PROGRESS NOTES
[facility]  [address] [place] [address]  [phone]    Bennie Lockwood [place]endish  : 1950     HPI   74 y.o., male returns in follow up for CaP. S/P RALP on 3/28/24. Path showed [place] 3+4, T2Nx with a focal L posterior positive margin. He has done well post op.  Cont to report sig KARIN (mult ppd) despite repeat PT sessions.  He has met with Dr. Roldan to discuss surgical tx options and is planning to meet again next mo.  PSA remains und 25.  No improvement with Cialis rehab.       History reviewed. No pertinent past medical history.  Past Surgical History:   Procedure Laterality Date    PROSTATE BIOPSY N/A 2024    PROSTATE BIOPSY FUSION performed by Winston Freeman DO at [facility] MAIN OR    PROSTATECTOMY N/A 3/28/2024    PROSTATECTOMY LAPAROSCOPIC ROBOTIC/POSSIBLE BILATERAL LYMPH NODE DISSECTION performed by Winston Freeman DO at [facility] MAIN OR     Current Outpatient Medications   Medication Sig Dispense Refill    trospium (SANCTURA) 20 MG tablet Take 1 tablet by mouth 2 times daily 60 tablet 11    tadalafil (CIALIS) 20 MG tablet Take 1 tablet by mouth as needed for Erectile Dysfunction 20 tablet 6    sennosides-docusate sodium (SENOKOT-S) 8.6-50 MG tablet Take 1 tablet by mouth daily 30 tablet 0     No current facility-administered medications for this visit.     No Known Allergies  Social History     Socioeconomic History    Marital status:      Spouse name: Not on file    Number of children: Not on file    Years of education: Not on file    Highest education level: Not on file   Occupational History    Not on file   Tobacco Use    Smoking status: Never    Smokeless tobacco: Never   Vaping Use    Vaping status: Never Used   Substance and Sexual Activity    Alcohol use: Never    Drug use: Never    Sexual activity: Not on file   Other Topics Concern    Not on file   Social History Narrative    Not on file     Social Drivers of Health     Financial Resource Strain: Not

## 2025-08-07 ENCOUNTER — TELEPHONE (OUTPATIENT)
Dept: UROLOGY | Age: 75
End: 2025-08-07

## 2025-09-03 ENCOUNTER — HOSPITAL ENCOUNTER (OUTPATIENT)
Dept: GENERAL RADIOLOGY | Age: 75
Discharge: HOME | End: 2025-09-05
Payer: OTHER GOVERNMENT

## 2025-09-03 DIAGNOSIS — M25.512 LEFT SHOULDER PAIN, UNSPECIFIED CHRONICITY: ICD-10-CM

## 2025-09-03 DIAGNOSIS — M25.511 RIGHT SHOULDER PAIN, UNSPECIFIED CHRONICITY: ICD-10-CM

## 2025-09-03 PROCEDURE — 73030 X-RAY EXAM OF SHOULDER: CPT

## (undated) DEVICE — SOLUTION IRRIG 1000ML STRL H2O USP PLAS POUR BTL

## (undated) DEVICE — BLADELESS OBTURATOR: Brand: WECK VISTA

## (undated) DEVICE — DRAPE TWL SURG 16X26IN BLU ORB04] ALLCARE INC]

## (undated) DEVICE — SUTURE MCRYL SZ 3-0 L27IN ABSRB VLT L17MM RB-1 1/2 CIR Y305H

## (undated) DEVICE — TRI-LUMEN FILTERED TUBE SET WITH ACTIVATED CHARCOAL FILTER: Brand: AIRSEAL

## (undated) DEVICE — COVER PRB W1XL11.8IN ENDOCAVITY CLR E BND NEOGUARD

## (undated) DEVICE — COLUMN DRAPE

## (undated) DEVICE — TROCAR: Brand: KII FIOS FIRST ENTRY

## (undated) DEVICE — SUTURE MCRYL SZ 3-0 L27IN ABSRB UD L17MM RB-1 1/2 CIR Y215H

## (undated) DEVICE — NEEDLE INSUF L120MM ULT VERES ENDOPATH

## (undated) DEVICE — Z DISC USE 2764362 SEAL ENDOSCP INSTR DIA5-8MM UNIV FOR CANN DA VINCI XI

## (undated) DEVICE — ARM DRAPE

## (undated) DEVICE — SUTURE ETHLN SZ 2-0 L18IN NONABSORBABLE BLK L26MM PS 3/8 585H

## (undated) DEVICE — PAD PT POS 36 IN SURGYPAD DISP

## (undated) DEVICE — STERILE PACKED. BORE DIAMETER 1.6 MM; ANGLE OF INSERTION 19° TO THE LONG AXIS OF THE TRANSDUCER: Brand: SINGLE-USE BIPLANE BIOPSY GUIDE

## (undated) DEVICE — MAX-CORE® DISPOSABLE CORE BIOPSY INSTRUMENT, 18G X 25CM: Brand: MAX-CORE

## (undated) DEVICE — AIRSEAL 12 MM ACCESS PORT AND PALM GRIP OBTURATOR WITH BLADELESS OPTICAL TIP, 120 MM LENGTH: Brand: AIRSEAL

## (undated) DEVICE — SOLUTION IV 1000ML 0.9% SOD CHL PH 5 INJ USP VIAFLX PLAS

## (undated) DEVICE — ROBOTIC PROSTATE: Brand: MEDLINE INDUSTRIES, INC.